# Patient Record
Sex: FEMALE | Race: WHITE | NOT HISPANIC OR LATINO | URBAN - METROPOLITAN AREA
[De-identification: names, ages, dates, MRNs, and addresses within clinical notes are randomized per-mention and may not be internally consistent; named-entity substitution may affect disease eponyms.]

---

## 2022-04-11 ENCOUNTER — MEDICAL CORRESPONDENCE (OUTPATIENT)
Dept: HEALTH INFORMATION MANAGEMENT | Facility: CLINIC | Age: 57
End: 2022-04-11
Payer: COMMERCIAL

## 2022-04-13 ENCOUNTER — TELEPHONE (OUTPATIENT)
Dept: ENDOCRINOLOGY | Facility: CLINIC | Age: 57
End: 2022-04-13
Payer: COMMERCIAL

## 2022-04-13 NOTE — TELEPHONE ENCOUNTER
I will forward this patient request to Dr.Angela Guerrero and get her thoughts on how to proceed with this referral  Angelica Ortiz, RN, [facility]  Diabetes Educator and RN Care Coordinator  Adult Endocrinology  [facility]

## 2022-04-13 NOTE — TELEPHONE ENCOUNTER
[facility] Call Center    Phone Message    May a detailed message be left on voicemail: yes     Reason for Call: Other: Patient is being referred by Anthony Lesch with [facility] to be seen for Primary hyperparathyroidism. Writer contacted patient to schedule but patient stated that she would like to see Dr Guerrero due to some of her siblings being patients of hers. Per protocols, Dr Guerrero is not taking new patients for this diagnosis. Sending encounter to clinic per patient's request due to her siblings being patients of hers. Please review and call patient.     Action Taken: Message routed to:  Adult Clinics: Endocrinology p [phone]    Travel Screening: Not Applicable

## 2022-05-10 ENCOUNTER — TELEPHONE (OUTPATIENT)
Dept: ENDOCRINOLOGY | Facility: CLINIC | Age: 57
End: 2022-05-10
Payer: COMMERCIAL

## 2022-05-10 ENCOUNTER — VIRTUAL VISIT (OUTPATIENT)
Dept: ENDOCRINOLOGY | Facility: CLINIC | Age: 57
End: 2022-05-10
Payer: COMMERCIAL

## 2022-05-10 DIAGNOSIS — R23.2 HOT FLASHES: ICD-10-CM

## 2022-05-10 DIAGNOSIS — E21.0 PRIMARY HYPERPARATHYROIDISM (H): Primary | ICD-10-CM

## 2022-05-10 PROCEDURE — 99215 OFFICE O/P EST HI 40 MIN: CPT | Mod: GT | Performed by: INTERNAL MEDICINE

## 2022-05-10 RX ORDER — CYCLOBENZAPRINE HCL 5 MG
5 TABLET ORAL
COMMUNITY
Start: 2021-10-22 | End: 2022-11-09

## 2022-05-10 NOTE — LETTER
5/10/2022         RE: Betsy Sutton  [address] [address] [address]        Dear Colleague,    Thank you for referring your patient, Betsy Sutton, to the [facility]. Please see a copy of my visit note below.    Video-Visit Details    Type of service:  Video Visit    Video call duration:    Start: 2:45 pm   Stop: 3:49 pm     Originating Location (pt. Location): Home  Distant Location (provider location):  [facility]   Platform used for Video Visit: Expa    The patient is seen in consultation at the request of Anthony Lesch for evaluation of hypercalcemia.     Betsy Sutton is a 56 year old female with a past medical history significant for migraine, Ménière disease, cervical radiculopathy and CVA.     The patient remembers being told she has a mildly elevated calcium level which needs monitoring years ago.   I reviewed the outside records.  The patient has been experiencing intermittent episodes of mild hypercalcemia since 2014, with a maximum documented calcium level of 10.7, on 3/15/2022, when parathyroid hormone level was elevated at 94 (15-65) and alkaline phosphatase was also mildly elevated at 123 ().  Prior parathyroid hormone level was elevated at 78 (14-72), in 2021 and normal in 2014 and 2015.  GFR has been normal.  Vitamin D level was 20.1, on 4/1/2022.     The abdominal CT from April 2022 revealed a nonobstructing 5 mm stone in the mid left kidney.  She had a DEXA scan done in April 2022 at [facility].  The images are not available for my review at the time of the visit.  The patient read to me the T-scores: -3.2 at the arm, -2.7 at the lumbar spine, -2.2 and -3.2 at the hip.    The patient remembers 1 episode of abdominal pain which occurred a couple of months ago when she thought she might had passed a kidney stone.  Otherwise, she has not been aware of kidney stones until the recent abdominal CT from April  2022.    Betsy denies being formally diagnosed with hypertension.  In the past, she has always been told she has a low blood pressure.  Recently, her blood pressure numbers have been higher.  Typically, she is intolerant to cold temperatures.  However, in the last couple of months, she has been experiencing episodes of hot flashes associated with palpitations, present several times a month.  She is not sure whether or not they are associated with increased stress.  The hot flashes which she used to experience around the menopause resolved.  She denies facial flushing, headaches, tremors, at the time she has the hot flashes.     She does not take any calcium supplements.  Multivitamins make her feel sick.  Her diet is quite restricted due to intolerance to certain food products.  She cannot tolerate dairy products.  She has been taking 2000 units vitamin D daily for the last month.  In the past, she used to take fish oil supplemented with vitamin D but she discontinued it for approximately 1 year.  Menopause occurred at age 60 and for period of 6 months she was treated with hormone replacement therapy.  Was not able to tolerate estrogen due to headaches.  Physical exercise consists of walking, for 30 to 40 minutes on a daily basis.  There is no family history of hip fractures, kidney stones or osteoporosis.      Other pertinent labs reviewed:  6/17/2021 Free T4 0.86, TSH 2.2  2013 total testosterone normal at 28 (14-76)    Past Medical History   Migraines   Meniere disease   CVA at age 36 - manifested with dizziness and severe headache   Factor 8 slightly elevated   Cervical radiculopathy   No fractures     Past Surgical History   No past surgical history on file.    Current Medications    Current Outpatient Medications:      cyclobenzaprine (FLEXERIL) 5 MG tablet, Take 5 mg by mouth, Disp: , Rfl:      ubrogepant (UBRELVY) 100 MG tablet, TAKE 1 TAB BY MOUTH ONCE, MAY REPEAT ONCE AFTER 2 HRS IF NEEDED, NOT TO EXCEED  200 MG IN 24 HRS, Disp: , Rfl:     Family History   Brother - thyroid cancer. Paternal grandfather CVA.     Social History  . She has 2 children. She denies smoking; she occasionally drinks alcohol. Denies using illicit drugs. Occupation: Teacher - first grade.     Review of Systems   Systemic:             Fatigue noticed over the years; not sleeping well   Eye:                      Episodes of blurred vision, not corrected by the glasses for the last couple of years   Lesly-Laryngeal:     No dysphagia, voice has been raspy at times, no cough     Breast:                  No breast symptoms  Cardiovascular:    As above   Pulmonary:           No pulmonary symptoms, no SOB or cough    Gastrointestinal:   She used to develop abd pain from certain food products - occasional episodes of diarrhea or constipation, no nausea or vomiting  Genitourinary:       increased thirst due during the night; she does not wake up during the night to use the restroom  Endocrine:            cold hands and feet   Neurological:        longstanding headaches/migraines; no tremor; numbness or tingling sensation in the R arm/hand; vertigo   Musculoskeletal:  Neck and R should pain; knee pain, toes pain; lower back pain   Skin:                     No skin symptoms, no dry skin, no hair falling out   Psychological:     Anxiety - got worse over the last couple of months; no difficulty concentrating, has mentions she has a harder time finding the right words.               Vital Signs     Previous Weights:    Wt Readings from Last 10 Encounters:   No data found for Wt        There were no vitals taken for this visit.    Physical Exam  General Appearance: alert, no distress noted   Eyes: grossly normal to inspection, conjunctivae and sclerae normal, no lid lag or stare   Respiratory: no audible wheeze, cough, or visible cyanosis.  No visible retractions or increased work of breathing.  Able to speak fully in complete sentences.  Neurological:  Cranial nerves grossly intact, mentation intact and speech normal; no tremor of the hands   Skin: no lesions on exposed skin   Psychological: mentation appears normal, affect normal, judgement and insight intact, normal speech and appearance well-groomed      Lab Results  I reviewed prior lab results documented in Epic.  No results found for: TSH    Assessment     Betsy Sutton is a 56 year old female with a past medical history significant for migraine, Ménière disease, cervical radiculopathy and CVA, seen for evaluation of primary hyperparathyroidism.    1.  Primary hyperparathyroidism  The patient has been experiencing intermittent episodes of hypercalcemia since 2014.  Recent imaging test revealed a silent kidney stone and a new diagnosis of osteoporosis.  Clinically, she does endorse fatigue, worsening anxiety, some difficulty finding words and back pain, but no other symptoms which can be attributed to hypercalcemia.  Recommendations:  Schedule an appointment with Dr. Ro  Start taking 500 mg calcium twice daily  Have a 24-hour urinary calcium checked in 2 or 3 weeks  Recheck calcium, phosphorus levels with her next lab work    2.  Osteoporosis  Continue to take the same dose of 2000 units vitamin D daily, in addition to calcium  F/up DXA scan in 1 year  If the bone mineral density does not improve following parathyroidectomy, the plan is consider active medications for osteoporosis.    3.  Hot flashes, of unclear etiology  Check 24-hour urinary metanephrines  Recheck thyroid hormone level     Orders Placed This Encounter   Procedures     TSH with free T4 reflex     Albumin level     Phosphorus     Parathyroid Hormone Intact     Calcium     Calcium timed urine with Creat Ratio     Creatinine timed urine     Metanephrine random or 24 hr urine     Otolaryngology Referral     65 minutes spent on the date of the encounter doing chart review, history and exam, documentation and further activities as noted  above.                        Betsy Sutton  is being evaluated via a billable video visit.      How would you like to obtain your AVS? [employer]  For the video visit, send the invitation by: Text to cell phone: [phone]  Will anyone else be joining your video visit? No            Again, thank you for allowing me to participate in the care of your patient.        Sincerely,        Lisa Guerrero MD

## 2022-05-10 NOTE — TELEPHONE ENCOUNTER
Patient was able to get connected for her visit.    Aileen Ramirez CMA  Adult Endocrinology  [facility]

## 2022-05-10 NOTE — PATIENT INSTRUCTIONS
Instructions for Collection of a 24 hour or Timed Urine Specimen    Your doctor has requested that you collect all of your urine for a 24 hour urine lab test. Please follow the instructions below    1. The day before you are to bring your specimen:  At 7:00am (or when you get up the day) empty your bladder as completely as possible. Discard this specimen.    2. During the 24 hour collection period store the collection container in a cool place or in the refrigerator. Some collections require a special preservative that will be in the container if required.    3. At 7:00am the next day (or when you started your urine collection on the previous day) void and add to the collection container. Record the time and date of the end of the collection period. This completes the 24 hour urine collection.    4. Bring the entire specimen directly to the lab  A lab with your name, medical record number and date of birth must be attached to the urine container.  A lab request form completed by your clinic/doctor with your name medical number number, date of birth and test requested must accompany the urine specimen.  Record on the lab request form the date and time (am/pm) of the start of the urine collection and date and time of the end of the urine collection.    5. If you have any questions, feel free to call the laboratory at [phone] or [phone] or your clinic.

## 2022-05-10 NOTE — TELEPHONE ENCOUNTER
[facility] Call Center    Phone Message    May a detailed message be left on voicemail: yes     Reason for Call: Other: Patient needs assitance with video visit start time 2:30pm they report not having a link .      Action Taken: Other: ENDO    Travel Screening: Not Applicable

## 2022-05-10 NOTE — PROGRESS NOTES
Video-Visit Details    Type of service:  Video Visit    Video call duration:    Start: 2:45 pm   Stop: 3:49 pm     Originating Location (pt. Location): Home  Distant Location (provider location):  [facility]   Platform used for Video Visit: Shaista    The patient is seen in consultation at the request of Anthony Lesch for evaluation of hypercalcemia.     Betsy Sutton is a 56 year old female with a past medical history significant for migraine, Ménière disease, cervical radiculopathy and CVA.     The patient remembers being told she has a mildly elevated calcium level which needs monitoring years ago.   I reviewed the outside records.  The patient has been experiencing intermittent episodes of mild hypercalcemia since 2014, with a maximum documented calcium level of 10.7, on 3/15/2022, when parathyroid hormone level was elevated at 94 (15-65) and alkaline phosphatase was also mildly elevated at 123 ().  Prior parathyroid hormone level was elevated at 78 (14-72), in 2021 and normal in 2014 and 2015.  GFR has been normal.  Vitamin D level was 20.1, on 4/1/2022.     The abdominal CT from April 2022 revealed a nonobstructing 5 mm stone in the mid left kidney.  She had a DEXA scan done in April 2022 at [facility].  The images are not available for my review at the time of the visit.  The patient read to me the T-scores: -3.2 at the arm, -2.7 at the lumbar spine, -2.2 and -3.2 at the hip.    The patient remembers 1 episode of abdominal pain which occurred a couple of months ago when she thought she might had passed a kidney stone.  Otherwise, she has not been aware of kidney stones until the recent abdominal CT from April 2022.    Betsy denies being formally diagnosed with hypertension.  In the past, she has always been told she has a low blood pressure.  Recently, her blood pressure numbers have been higher.  Typically, she is intolerant to cold temperatures.  However, in the  last couple of months, she has been experiencing episodes of hot flashes associated with palpitations, present several times a month.  She is not sure whether or not they are associated with increased stress.  The hot flashes which she used to experience around the menopause resolved.  She denies facial flushing, headaches, tremors, at the time she has the hot flashes.     She does not take any calcium supplements.  Multivitamins make her feel sick.  Her diet is quite restricted due to intolerance to certain food products.  She cannot tolerate dairy products.  She has been taking 2000 units vitamin D daily for the last month.  In the past, she used to take fish oil supplemented with vitamin D but she discontinued it for approximately 1 year.  Menopause occurred at age 60 and for period of 6 months she was treated with hormone replacement therapy.  Was not able to tolerate estrogen due to headaches.  Physical exercise consists of walking, for 30 to 40 minutes on a daily basis.  There is no family history of hip fractures, kidney stones or osteoporosis.      Other pertinent labs reviewed:  6/17/2021 Free T4 0.86, TSH 2.2  2013 total testosterone normal at 28 (14-76)    Past Medical History   Migraines   Meniere disease   CVA at age 36 - manifested with dizziness and severe headache   Factor 8 slightly elevated   Cervical radiculopathy   No fractures     Past Surgical History   No past surgical history on file.    Current Medications    Current Outpatient Medications:      cyclobenzaprine (FLEXERIL) 5 MG tablet, Take 5 mg by mouth, Disp: , Rfl:      ubrogepant (UBRELVY) 100 MG tablet, TAKE 1 TAB BY MOUTH ONCE, MAY REPEAT ONCE AFTER 2 HRS IF NEEDED, NOT TO EXCEED 200 MG IN 24 HRS, Disp: , Rfl:     Family History   Brother - thyroid cancer. Paternal grandfather CVA.     Social History  . She has 2 children. She denies smoking; she occasionally drinks alcohol. Denies using illicit drugs. Occupation: Teacher - first  grade.     Review of Systems   Systemic:             Fatigue noticed over the years; not sleeping well   Eye:                      Episodes of blurred vision, not corrected by the glasses for the last couple of years   Lesly-Laryngeal:     No dysphagia, voice has been raspy at times, no cough     Breast:                  No breast symptoms  Cardiovascular:    As above   Pulmonary:           No pulmonary symptoms, no SOB or cough    Gastrointestinal:   She used to develop abd pain from certain food products - occasional episodes of diarrhea or constipation, no nausea or vomiting  Genitourinary:       increased thirst due during the night; she does not wake up during the night to use the restroom  Endocrine:            cold hands and feet   Neurological:        longstanding headaches/migraines; no tremor; numbness or tingling sensation in the R arm/hand; vertigo   Musculoskeletal:  Neck and R should pain; knee pain, toes pain; lower back pain   Skin:                     No skin symptoms, no dry skin, no hair falling out   Psychological:     Anxiety - got worse over the last couple of months; no difficulty concentrating, has mentions she has a harder time finding the right words.               Vital Signs     Previous Weights:    Wt Readings from Last 10 Encounters:   No data found for Wt        There were no vitals taken for this visit.    Physical Exam  General Appearance: alert, no distress noted   Eyes: grossly normal to inspection, conjunctivae and sclerae normal, no lid lag or stare   Respiratory: no audible wheeze, cough, or visible cyanosis.  No visible retractions or increased work of breathing.  Able to speak fully in complete sentences.  Neurological: Cranial nerves grossly intact, mentation intact and speech normal; no tremor of the hands   Skin: no lesions on exposed skin   Psychological: mentation appears normal, affect normal, judgement and insight intact, normal speech and appearance well-groomed      Lab  Results  I reviewed prior lab results documented in Epic.  No results found for: TSH    Assessment     Betsy Sutton is a 56 year old female with a past medical history significant for migraine, Ménière disease, cervical radiculopathy and CVA, seen for evaluation of primary hyperparathyroidism.    1.  Primary hyperparathyroidism  The patient has been experiencing intermittent episodes of hypercalcemia since 2014.  Recent imaging test revealed a silent kidney stone and a new diagnosis of osteoporosis.  Clinically, she does endorse fatigue, worsening anxiety, some difficulty finding words and back pain, but no other symptoms which can be attributed to hypercalcemia.  Recommendations:  Schedule an appointment with Dr. Ro  Start taking 500 mg calcium twice daily  Have a 24-hour urinary calcium checked in 2 or 3 weeks  Recheck calcium, phosphorus levels with her next lab work    2.  Osteoporosis  Continue to take the same dose of 2000 units vitamin D daily, in addition to calcium  F/up DXA scan in 1 year  If the bone mineral density does not improve following parathyroidectomy, the plan is consider active medications for osteoporosis.    3.  Hot flashes, of unclear etiology  Check 24-hour urinary metanephrines  Recheck thyroid hormone level     Orders Placed This Encounter   Procedures     TSH with free T4 reflex     Albumin level     Phosphorus     Parathyroid Hormone Intact     Calcium     Calcium timed urine with Creat Ratio     Creatinine timed urine     Metanephrine random or 24 hr urine     Otolaryngology Referral     65 minutes spent on the date of the encounter doing chart review, history and exam, documentation and further activities as noted above.

## 2022-05-10 NOTE — PROGRESS NOTES
Betsy Sutton  is being evaluated via a billable video visit.      How would you like to obtain your AVS? [employer]  For the video visit, send the invitation by: Text to cell phone: [phone]  Will anyone else be joining your video visit? No

## 2022-05-10 NOTE — NURSING NOTE
Medication additional: Pt notes also taking Vitamin D 2000 mg  Allergies: Unsure about the Erythromycin

## 2022-05-11 ENCOUNTER — TELEPHONE (OUTPATIENT)
Dept: ENDOCRINOLOGY | Facility: CLINIC | Age: 57
End: 2022-05-11
Payer: COMMERCIAL

## 2022-05-11 NOTE — TELEPHONE ENCOUNTER
5/11 1st attempt.  LVM for patient to schedule:      -  6 month follow up with Cesar around 11/10/22.    - 24 hour urine/fasting labs in 2-3 weeks    - consult with Dr. Ro at patient convenience.    Please assist patient in scheduling when she calls back.    Thanks    Marly Lyles  Pediatric Specialty /Adult Endocrinology  [facility]

## 2022-05-12 ENCOUNTER — LAB (OUTPATIENT)
Dept: LAB | Facility: CLINIC | Age: 57
End: 2022-05-12
Payer: COMMERCIAL

## 2022-05-12 DIAGNOSIS — R23.2 HOT FLASHES: ICD-10-CM

## 2022-05-12 LAB — TSH SERPL DL<=0.005 MIU/L-ACNC: 2.15 MU/L (ref 0.4–4)

## 2022-05-12 PROCEDURE — 36415 COLL VENOUS BLD VENIPUNCTURE: CPT

## 2022-05-12 PROCEDURE — 84443 ASSAY THYROID STIM HORMONE: CPT

## 2022-05-29 ENCOUNTER — HEALTH MAINTENANCE LETTER (OUTPATIENT)
Age: 57
End: 2022-05-29

## 2022-06-07 ENCOUNTER — LAB (OUTPATIENT)
Dept: LAB | Facility: CLINIC | Age: 57
End: 2022-06-07
Payer: COMMERCIAL

## 2022-06-07 DIAGNOSIS — E21.0 PRIMARY HYPERPARATHYROIDISM (H): ICD-10-CM

## 2022-06-07 DIAGNOSIS — R23.2 HOT FLASHES: ICD-10-CM

## 2022-06-07 LAB
ALBUMIN SERPL-MCNC: 4.2 G/DL (ref 3.4–5)
CALCIUM 24H UR-MRATE: 0.26 G/SPEC
CALCIUM SERPL-MCNC: 10.4 MG/DL (ref 8.5–10.1)
CALCIUM UR-MCNC: 8.5 MG/DL
CALCIUM/CREAT UR: 0.22 G/G CR
COLLECT DURATION TIME UR: 24 H
COLLECT DURATION TIME UR: 24 H
CREAT 24H UR-MRATE: 1.16 G/SPEC (ref 0.8–1.8)
CREAT 24H UR-MRATE: 1.16 G/SPEC (ref 0.8–1.8)
CREAT UR-MCNC: 38 MG/DL
CREAT UR-MCNC: 38 MG/DL
PHOSPHATE SERPL-MCNC: 3.8 MG/DL (ref 2.5–4.5)
PTH-INTACT SERPL-MCNC: 91 PG/ML (ref 18–80)
SPECIMEN VOL UR: 3050 ML
SPECIMEN VOL UR: 3050 ML

## 2022-06-07 PROCEDURE — 83835 ASSAY OF METANEPHRINES: CPT | Mod: 90

## 2022-06-07 PROCEDURE — 99000 SPECIMEN HANDLING OFFICE-LAB: CPT

## 2022-06-07 PROCEDURE — 82310 ASSAY OF CALCIUM: CPT

## 2022-06-07 PROCEDURE — 82340 ASSAY OF CALCIUM IN URINE: CPT

## 2022-06-07 PROCEDURE — 82040 ASSAY OF SERUM ALBUMIN: CPT

## 2022-06-07 PROCEDURE — 36415 COLL VENOUS BLD VENIPUNCTURE: CPT

## 2022-06-07 PROCEDURE — 82570 ASSAY OF URINE CREATININE: CPT

## 2022-06-07 PROCEDURE — 83970 ASSAY OF PARATHORMONE: CPT

## 2022-06-07 PROCEDURE — 84100 ASSAY OF PHOSPHORUS: CPT

## 2022-06-07 PROCEDURE — 81050 URINALYSIS VOLUME MEASURE: CPT

## 2022-06-09 ENCOUNTER — PREP FOR PROCEDURE (OUTPATIENT)
Dept: SURGERY | Facility: CLINIC | Age: 57
End: 2022-06-09
Payer: COMMERCIAL

## 2022-06-09 ENCOUNTER — TELEPHONE (OUTPATIENT)
Dept: SURGERY | Facility: CLINIC | Age: 57
End: 2022-06-09

## 2022-06-09 ENCOUNTER — OFFICE VISIT (OUTPATIENT)
Dept: SURGERY | Facility: CLINIC | Age: 57
End: 2022-06-09
Payer: COMMERCIAL

## 2022-06-09 VITALS
SYSTOLIC BLOOD PRESSURE: 143 MMHG | HEIGHT: 67 IN | WEIGHT: 129.6 LBS | OXYGEN SATURATION: 99 % | BODY MASS INDEX: 20.34 KG/M2 | HEART RATE: 89 BPM | DIASTOLIC BLOOD PRESSURE: 110 MMHG

## 2022-06-09 DIAGNOSIS — E21.3 HYPERPARATHYROIDISM (H): Primary | ICD-10-CM

## 2022-06-09 DIAGNOSIS — E21.0 PRIMARY HYPERPARATHYROIDISM (H): ICD-10-CM

## 2022-06-09 PROCEDURE — 99203 OFFICE O/P NEW LOW 30 MIN: CPT | Performed by: SURGERY

## 2022-06-09 RX ORDER — DEXAMETHASONE SODIUM PHOSPHATE 10 MG/ML
10 INJECTION, SOLUTION INTRAMUSCULAR; INTRAVENOUS ONCE
Status: CANCELLED | OUTPATIENT
Start: 2022-06-09 | End: 2022-06-09

## 2022-06-09 ASSESSMENT — PAIN SCALES - GENERAL: PAINLEVEL: MODERATE PAIN (5)

## 2022-06-09 NOTE — TELEPHONE ENCOUNTER
Scheduled surgery in clinic.     Changed surgery from 7/5 to 7/12 in [place] because patient needs nuc med injection/scan prior to surgery. The pharmacy does not believe we will get the nuc med medication in time due to the holiday. They said we may, but there is no guarantee we will get it on time for surgery.       On 7/12, patient will go to [place] for injection and scan at 7:15 AM. After injection/scan, patient will travel to [facility] for surgery and check in at 12:00 PM for 1:30 surgery.     Post-op has been rescheduled to 7/25 in [place] as the [facility] clinic is closed that week.    Left a detailed message for patient with above information (patient gave permission to) and sent a very detailed [employer] message to patient, per patient request.     H&P with PCP.    Patient will take an at home COVID test 1-2 days before the procedure and will either upload a picture on [employer] or bring the results with her the day of surgery.    Patient already has surgery packet.    No other questions/concerns.

## 2022-06-09 NOTE — NURSING NOTE
"Betsy Sutton's chief complaint for this visit includes:  Chief Complaint   Patient presents with     Consult     Primary hyperparathyroidism     PCP: Lesch, Anthony    Referring Provider:  Lisa Guerrero MD  [id number] [place] [address]    BP (!) 143/110   Pulse 89   Ht 1.702 m (5' 7\")   Wt 58.8 kg (129 lb 9.6 oz)   SpO2 99%   BMI 20.30 kg/m    Moderate Pain (5)        Allergies   Allergen Reactions     Codeine Other (See Comments)     Penicillins Other (See Comments)     Sulfa Drugs Unknown         Do you need any medication refills at today's visit? No    Aye Loredo CMA        "

## 2022-06-09 NOTE — LETTER
"    6/9/2022         RE: Betsy Sutton  [address] [address]        Dear Colleague,    Thank you for referring your patient, Betsy Sutton, to the [facility]. Please see a copy of my visit note below.    SURGERY CLINIC CONSULTATION    REASON FOR CONSULTATION:  Betsy Sutton was referred by Dr. Guerrero for evaluation and discussion of treatment options for HPTH     HISTORY OF PRESENT ILLNESS:  Betsy Sutton is a 56 year old female who has a history of hypercalcemia since 2014. Most recent labs: Ca 10.4, PTH 91, UkCa 220mg/24hrs, Vitd D 21.     Symptoms associated with HPTH include CT-nehpriliathiasis, Osteoporosis. Muscle aches and fatigue. No family or previous h/o HPTH.        REVIEW OF SYSTEMS:  ROS EXAM: 10pt ROS pertinent for that noted in HPI  Patient Active Problem List   Diagnosis     Hyperparathyroidism (H)       Past Surgical History:   Procedure Laterality Date     PARATHYROIDECTOMY N/A 7/12/2022    Procedure: PARATHYROIDECTOMY;  Surgeon: Lori Ro MD;  [facility] [facility] [place]       Allergies   Allergen Reactions     Codeine Other (See Comments)     Erythromycin      Penicillins Other (See Comments)     Sulfa Drugs Unknown       Medications reviewed in EMR        No family history on file.     PHYSICAL EXAM:  BP (!) 143/110   Pulse 89   Ht 1.702 m (5' 7\")   Wt 58.8 kg (129 lb 9.6 oz)   SpO2 99%   BMI 20.30 kg/m      Neck: Thyroid gland normal size and without nodules. No cervical lymphadenopathy    I personally reviewed the radiographic images and laboratory data    ASSESSMENT:   1. Primary hyperparathyroidism (H)        PLAN:   I would first like to get localizing scan (Nuc Med). Then recommend neck exploration resection parathyroid adenoma. The procedure and risks were discussed with the patient. Will schedule for surgery.    Lori Ro MD                Again, thank you for allowing me to participate in the care of your " patient.        Sincerely,        Lori Ro MD

## 2022-06-09 NOTE — PATIENT INSTRUCTIONS
Surgery Instructions    Always follow your surgeon s instructions. If you don t, your surgery could be cancelled. Please use the following checklist.  Your surgery is on: The surgery scheduler will contact you within 1 week of your consult with the surgeon. If you do not hear from them, please call the clinic or RN Care Coordinator for your provider.    Time: Prearrival times can vary depending on location/type of surgery.  [place] - 2 hour pre-arrival  [facility]/[place] - 2 hour pre-arrival  [place] - 1 hour pre-arrival    Note:  These times may change. A nurse will call you before surgery to confirm. If you have not received a call or if you have more questions, please call us on the working day before your surgery:  [place]: [phone] or [phone] (9am to 5:30pm)  [facility]: [phone] (8am to 6pm)  [place]: [phone] (9am to 5pm)  [facility] [phone] (7am to 4pm)  Prior to surgery  Have a pre-op physical exam with your Primary Doctor within 30 days of surgery  Ask your doctor to send all of your results to the surgery center/hospital before surgery. Your doctor also may ask you to bring the results with you on the day of surgery.  Tell your doctor if:  You are allergic to latex or rubber (latex and rubber gloves are often used in medical care).  You are taking any medicines (including aspirin), vitamins, or herbal products. You may need to stop taking some medicines before surgery.  You have any medical problems (allergies, diabetes, or heart disease, for example).  You have a pacemaker or an AICD (automatic implanted cardiac defibrillator). If you do, please bring the ID card with you on the day of surgery.  People who smoke have a higher risk of infection after surgery. Ask your doctor how you can quit smoking.  If you Primary Doctor is not within the [facility] system, you will need to have your pre-op physical faxed to us to be scanned into your chart.  [place]: [phone]  or [phone]  [facility] ([place]): [phone]  [facility] ([facility]): [phone]  Schedule to complete pre-procedure COVID-19 Testing    - All patients MUST get tested for COVID-19. Your test needs to happen 2 to 4 days before you check in to the hospital or surgery site.  - A clinic scheduler will call about a week in advance to set up a testing time at one of our labs. We ll take a swab of your nose or throat.  Note: If you go to a clinic or pharmacy like [facility] or [facility] for your test, make sure you get a test inside the nose. Tests inside the nose are:   - A naso-pharyngeal (NP) RT-PCR test   - An anterior nares RT-PCR test  - Do NOT get a  rapid  test or a saliva (spit) test.  - After the test, please stay at home and stay out of contact with other people. It will be harder for you to recover if you get COVID-19 before your treatment.  Call your insurance company. Ask if you need pre-approval for your surgery. If you do not have insurance, please let us know. If you wish to speak to the , please alert the clinic staff so this can be arranged.  Arrange for someone to drive you home after surgery.  will need to be a responsible adult (18 years or older) that will provide transportation to and from surgery and stay in the waiting room during your surgery. You may not drive yourself or take public transportation to and from surgery.  Arrange for someone to stay with you for 24 hours after you go home. This person must be a responsible adult (18 years or older).  Call your surgeon or their nurse if there is any change in your health (cold, flu, infections, hospitalizations).  Do not smoke, drink alcohol, or take over-the-counter medicine for 24 hours before and after surgery.  If you take prescribed drugs, you may need to stop them until after the surgery.  Discuss what medications to take or not take prior to surgery with your Primary Doctor at your pre-op physical.  Avoid over-the-counter blood-thinning medications such as Aspirin, Ibuprofen, vitamin E, or fish oil 7 days prior to surgery (unless otherwise directed by your Primary Doctor). Tylenol is a good alternative for mild pain relief prior to surgery.  Eating and drinking guidelines prior to surgery:  Stop all solid food consumption 8 hours prior to surgery  You may drink clear liquids up to 2 hours prior to surgery (water, fruit juices without pulp, jello, tea/coffee without creamer, sports drinks, clear-fat free broth (bouillon or consomme), popsicles (without milk, bits of fruit, or seeds/nuts)  Follow instructions given for showering or bathing before surgery.    Use 8 ounces of antiseptic surgical soap, like:  Hibiclens, Scrub Care, or Exidine  You can find it at your local pharmacy, clinic, or retail store. If you have trouble, ask your pharmacist to help you find the right substitute.  Please wash with one of the above soaps twice before coming to the hospital for your surgery. This will decrease bacteria (germs) on your skin. It will also help reduce your chance of infection after surgery.  Items you will need for showerin newly washed washcloths  2 newly washed towels  8 ounces of one of the above soaps  Following these instructions:  The evening before surgery: Shower or bathe as you normally would, using your regular soap and a clean washcloth. Give special attention to places where your incision (surgical cut) or catheters will be. This includes your groin area. Rinse well. You may wash your hair with your regular shampoo. Next, wash your body with 4 ounces of the antiseptic soap. Use a clean, damp washcloth and gently clean your body (from the chin down). If your surgery involves your head, use the special soap on your head and scalp. Rinse well and dry off using a newly washed towel.  The morning of surgery: Repeat the same process as the evening shower.  Other suggestions:  Do not shave within 12  inches of your incision (surgical cut) area for at least 3 days before surgery. Shaving can make small cuts in the skin. This puts you at higher risk of infection.  Wear freshly washed pajamas or clothing after your evening shower.  Wear freshly washed clothes the day of surgery.  Wash and change your bed sheets the day before surgery to have clean bed sheets after your shower and when you get home from surgery.  If you have trouble washing all areas, make sure someone helps you.  Don't use any deodorant, lotion or powder after your shower.  Women who are menstruating should wear a fresh sanitary pad to the hospital.  Do not wear or add deodorant, cologne, lotion, makeup, nail polish or jewelry to surgery. If you wear fake nails, please remove at least one nail before coming to surgery (an oxygen monitor needs to be placed on your finger during surgery).  Bring these items to the surgery center/hospital:  Insurance card  Money for parking and co-pays, if needed  A list of all the medicines you take. Include vitamins, minerals, herbs, and over-the-counter drugs.  Note any drug allergies.  A copy of your advance health care directive, if you have one. This tells us what treatment you would want--and who would make health care decisions--if you could no longer speak for yourself. You may request this form in advance or download it from [email or web address].  A case for glasses, contact lenses, hearing aids, or dentures.  Your inhaler or CPAP machine, if you use these at home.  Leave extra cash, jewelry, and other valuables at home.  When you arrive  When you get to the surgery center/hospital, you will:  Check in. If you are under age 18, you must be with a parent or legal guardian.  Sign consent forms, if you haven t already. These forms state that you know the risks and benefits of surgery. When you sign the forms, you give us permission to do the surgery. Do not sign them unless you understand what will happen  during and after your surgery. If you have any questions about your surgery, ask to speak with your doctor before you sign the forms. If you don t understand the answers, ask again.  Receive a copy of the Patient s Bill of Rights. If you do not receive a copy, please ask for one.  Change into hospital clothes. Your belongings will be placed in a bag. We will return them to you after surgery.  Meet with the anesthesia provider. He or she will tell you what kind of anesthesia (medicine) will be used to keep you comfortable during surgery.  Remember: it s okay to remind doctors and nurses to wash their hands before touching you.  In most cases, your surgeon will use a marker to write his or her initials on the surgery site. This ensures that the exact site is operated on.  For safety reasons, we will ask you the same questions many times. For example, we may ask your name and birth date over and over again.  Friends and family can stay with you until it s time for surgery. While you re in surgery, they will be in the waiting area. Please note that cell phones are not allowed in some patient care areas.  If you have questions about what will happen in the operating room, talk to your care team.  After surgery  We will move you to a recovery room, where we will watch you closely. If you have any pain or discomfort, tell your nurse. He or she will try to make you comfortable.  If you are staying overnight, we will move you to your hospital room after you are awake.  If you are going home, we will move you to another room. Friends and family may be able to join you. The length of time you spend in recovery depend on the type of medicine you received, your medical condition, the type of surgery you had, or your response to the anesthesia given during your procedure.  When you are discharged from the recovery room, the nurses will review instructions with you and your caregiver.  Please wash your hands every time you touch  the wound or change bandages or dressings.  Do not submerge the wound in water.  You may not use a bathtub or hot tub until the wound is closed. The wait time frame is generally 2-3 weeks, but any open area can be a source of incoming bacteria, so it is better to be on the safe side and avoid water submersion until your wound is fully healed.  You may take a shower 24 hours after surgery. Double check with your surgeon if it is OK for water to run over the wound, whether it has been sutured, stapled, glued, or is open. You may gently wash the wound using the antiseptic soap provided for your pre-surgery showering (do not use a washcloth). Any mild soap will work as well.  Many surgical wounds will have small white strips of tape on them called steri-strips.  Do not remove these. The edges will curl and fall off within 7-10 days with normal showering.  If you are going home with sutures (stitches) or staples, you must return to the clinic to have them taken out, usually within 1-2 weeks. Some stitches are dissolvable and do not require removal. Make sure to clarify with your surgeon or surgery nurse reviewing discharge paperwork what kind of sutures you have.  Signs and symptoms of infection include:  Fever, temperature over 101.5   F  Redness  Swelling  Increased pain  Green or yellow drainage which may or may not have a foul odor  Dealing with pain  A nurse will check your comfort level often during your stay. He or she will work with you to manage your pain.  Remember:  All pain is real. There are many ways to control pain. We can help you decide what works best for you.  Ask for pain medicine when you need it. Don t try to  tough it out --this can make you feel worse. Always take your medicine as ordered.  Medicine doesn t work the same for everyone. If your medicine isn t working, tell your nurse. There may be other medicines or treatments we can try.  Going home  We will let you know when you re ready to leave  the surgery center or hospital. Before you leave, we will tell you how to care for yourself at home and prevent infections. If you do not understand something, please say so. We will answer any questions you have. We will then help you get ready to leave.  Remember, you must have a responsible adult (18 years or older) to stay with you 24 hours after you leave the hospital.  Take it easy when you get home. You will need some time to recover--you may be more tired than you realize at first. Rest and relax for at least the first 24 hours at home. You ll feel better and heal faster if you take good care of yourself.  Follow the discharge instructions that are given to you when you leave the surgery center or hospital  Please call the clinic if you experience any problems during regular clinic hours (Monday-Friday 8:00am-5:00pm).  If you experience problems during non-clinic hours, please call the [facility] on-call line at [phone] and ask the  to page the on-call Provider for your specialty. The on-call Provider will call you back and can triage your symptoms and further advise. If you are having an emergency, always call 911 or seek immediate evaluation at the Emergency Room.  Locations  [facility] ([facility] [place]  [address] [place] [address] [id number] [phone]   [email or web address]

## 2022-06-10 LAB
COLLECT DURATION TIME SPEC: NORMAL H
CREAT 24H UR-MRATE: 1220 MG/D
CREAT UR-MCNC: 40 MG/DL
METANEPH 24H UR-MCNC: 32 UG/L
METANEPH 24H UR-MRATE: 98 UG/D
METANEPH+NORMETANEPH UR-IMP: NORMAL
METANEPH/CREAT 24H UR: 80 UG/G CRT
NORMETANEPHRINE 24H UR-MCNC: 65 UG/L
NORMETANEPHRINE 24H UR-MRATE: 198 UG/D
NORMETANEPHRINE/CREAT 24H UR: 162 UG/G CRT
SPECIMEN VOL ?TM UR: NORMAL ML

## 2022-07-11 ENCOUNTER — ANESTHESIA EVENT (OUTPATIENT)
Dept: SURGERY | Facility: AMBULATORY SURGERY CENTER | Age: 57
End: 2022-07-11
Payer: COMMERCIAL

## 2022-07-12 ENCOUNTER — ANESTHESIA (OUTPATIENT)
Dept: SURGERY | Facility: AMBULATORY SURGERY CENTER | Age: 57
End: 2022-07-12
Payer: COMMERCIAL

## 2022-07-12 ENCOUNTER — HOSPITAL ENCOUNTER (OUTPATIENT)
Dept: NUCLEAR MEDICINE | Facility: CLINIC | Age: 57
Setting detail: NUCLEAR MEDICINE
Discharge: HOME | End: 2022-07-12
Attending: SURGERY
Payer: COMMERCIAL

## 2022-07-12 ENCOUNTER — HOSPITAL ENCOUNTER (OUTPATIENT)
Facility: AMBULATORY SURGERY CENTER | Age: 57
Discharge: HOME | End: 2022-07-12
Attending: SURGERY
Payer: COMMERCIAL

## 2022-07-12 VITALS
DIASTOLIC BLOOD PRESSURE: 64 MMHG | WEIGHT: 128 LBS | HEIGHT: 67 IN | HEART RATE: 97 BPM | TEMPERATURE: 96.6 F | SYSTOLIC BLOOD PRESSURE: 115 MMHG | BODY MASS INDEX: 20.09 KG/M2 | OXYGEN SATURATION: 95 % | RESPIRATION RATE: 12 BRPM

## 2022-07-12 DIAGNOSIS — E21.3 HYPERPARATHYROIDISM (H): ICD-10-CM

## 2022-07-12 DIAGNOSIS — E21.0 PRIMARY HYPERPARATHYROIDISM (H): ICD-10-CM

## 2022-07-12 LAB
PTH-INTACT SERPL-MCNC: 112 PG/ML (ref 15–65)
PTH-INTACT SERPL-MCNC: 41 PG/ML (ref 15–65)
PTH-INTACT SERPL-MCNC: 41 PG/ML (ref 15–65)

## 2022-07-12 PROCEDURE — 83970 ASSAY OF PARATHORMONE: CPT | Performed by: PATHOLOGY

## 2022-07-12 PROCEDURE — 343N000001 HC RX 343: Performed by: SURGERY

## 2022-07-12 PROCEDURE — A9500 TC99M SESTAMIBI: HCPCS | Performed by: SURGERY

## 2022-07-12 PROCEDURE — 78072 PARATHYRD PLANAR W/SPECT&CT: CPT

## 2022-07-12 PROCEDURE — 88331 PATH CONSLTJ SURG 1 BLK 1SPC: CPT | Mod: TC | Performed by: SURGERY

## 2022-07-12 PROCEDURE — 60500 EXPLORE PARATHYROID GLANDS: CPT | Mod: GC | Performed by: SURGERY

## 2022-07-12 PROCEDURE — 88305 TISSUE EXAM BY PATHOLOGIST: CPT | Mod: TC | Performed by: SURGERY

## 2022-07-12 PROCEDURE — 88331 PATH CONSLTJ SURG 1 BLK 1SPC: CPT | Mod: 26 | Performed by: PATHOLOGY

## 2022-07-12 PROCEDURE — 88305 TISSUE EXAM BY PATHOLOGIST: CPT | Mod: 26 | Performed by: PATHOLOGY

## 2022-07-12 PROCEDURE — 60500 EXPLORE PARATHYROID GLANDS: CPT

## 2022-07-12 PROCEDURE — 78072 PARATHYRD PLANAR W/SPECT&CT: CPT | Mod: 26 | Performed by: RADIOLOGY

## 2022-07-12 PROCEDURE — A9516 IODINE I-123 SOD IODIDE MIC: HCPCS | Performed by: SURGERY

## 2022-07-12 RX ORDER — SODIUM CHLORIDE, SODIUM LACTATE, POTASSIUM CHLORIDE, CALCIUM CHLORIDE 600; 310; 30; 20 MG/100ML; MG/100ML; MG/100ML; MG/100ML
INJECTION, SOLUTION INTRAVENOUS CONTINUOUS
Status: DISCONTINUED | OUTPATIENT
Start: 2022-07-12 | End: 2022-07-13 | Stop reason: HOSPADM

## 2022-07-12 RX ORDER — PROPOFOL 10 MG/ML
INJECTION, EMULSION INTRAVENOUS PRN
Status: DISCONTINUED | OUTPATIENT
Start: 2022-07-12 | End: 2022-07-12

## 2022-07-12 RX ORDER — ONDANSETRON 4 MG/1
4 TABLET, ORALLY DISINTEGRATING ORAL EVERY 30 MIN PRN
Status: DISCONTINUED | OUTPATIENT
Start: 2022-07-12 | End: 2022-07-13 | Stop reason: HOSPADM

## 2022-07-12 RX ORDER — SODIUM CHLORIDE, SODIUM LACTATE, POTASSIUM CHLORIDE, CALCIUM CHLORIDE 600; 310; 30; 20 MG/100ML; MG/100ML; MG/100ML; MG/100ML
INJECTION, SOLUTION INTRAVENOUS CONTINUOUS PRN
Status: DISCONTINUED | OUTPATIENT
Start: 2022-07-12 | End: 2022-07-12

## 2022-07-12 RX ORDER — DEXAMETHASONE SODIUM PHOSPHATE 10 MG/ML
10 INJECTION, SOLUTION INTRAMUSCULAR; INTRAVENOUS ONCE
Status: DISCONTINUED | OUTPATIENT
Start: 2022-07-12 | End: 2022-07-12 | Stop reason: HOSPADM

## 2022-07-12 RX ORDER — OXYCODONE HYDROCHLORIDE 5 MG/1
5 TABLET ORAL EVERY 4 HOURS PRN
Status: DISCONTINUED | OUTPATIENT
Start: 2022-07-12 | End: 2022-07-13 | Stop reason: HOSPADM

## 2022-07-12 RX ORDER — MEPERIDINE HYDROCHLORIDE 25 MG/ML
12.5 INJECTION INTRAMUSCULAR; INTRAVENOUS; SUBCUTANEOUS
Status: DISCONTINUED | OUTPATIENT
Start: 2022-07-12 | End: 2022-07-13 | Stop reason: HOSPADM

## 2022-07-12 RX ORDER — EPHEDRINE SULFATE 50 MG/ML
INJECTION, SOLUTION INTRAMUSCULAR; INTRAVENOUS; SUBCUTANEOUS PRN
Status: DISCONTINUED | OUTPATIENT
Start: 2022-07-12 | End: 2022-07-12

## 2022-07-12 RX ORDER — HYDROCODONE BITARTRATE AND ACETAMINOPHEN 5; 325 MG/1; MG/1
1 TABLET ORAL
Status: COMPLETED | OUTPATIENT
Start: 2022-07-12 | End: 2022-07-12

## 2022-07-12 RX ORDER — LIDOCAINE 40 MG/G
CREAM TOPICAL
Status: DISCONTINUED | OUTPATIENT
Start: 2022-07-12 | End: 2022-07-12 | Stop reason: HOSPADM

## 2022-07-12 RX ORDER — HYDROMORPHONE HYDROCHLORIDE 1 MG/ML
0.2 INJECTION, SOLUTION INTRAMUSCULAR; INTRAVENOUS; SUBCUTANEOUS EVERY 5 MIN PRN
Status: DISCONTINUED | OUTPATIENT
Start: 2022-07-12 | End: 2022-07-13 | Stop reason: HOSPADM

## 2022-07-12 RX ORDER — DEXAMETHASONE SODIUM PHOSPHATE 4 MG/ML
INJECTION, SOLUTION INTRA-ARTICULAR; INTRALESIONAL; INTRAMUSCULAR; INTRAVENOUS; SOFT TISSUE PRN
Status: DISCONTINUED | OUTPATIENT
Start: 2022-07-12 | End: 2022-07-12

## 2022-07-12 RX ORDER — PROPOFOL 10 MG/ML
INJECTION, EMULSION INTRAVENOUS CONTINUOUS PRN
Status: DISCONTINUED | OUTPATIENT
Start: 2022-07-12 | End: 2022-07-12

## 2022-07-12 RX ORDER — CYCLOBENZAPRINE HCL 5 MG
5 TABLET ORAL
COMMUNITY
Start: 2022-06-24 | End: 2022-11-09

## 2022-07-12 RX ORDER — ONDANSETRON 2 MG/ML
4 INJECTION INTRAMUSCULAR; INTRAVENOUS EVERY 30 MIN PRN
Status: DISCONTINUED | OUTPATIENT
Start: 2022-07-12 | End: 2022-07-13 | Stop reason: HOSPADM

## 2022-07-12 RX ORDER — FENTANYL CITRATE 50 UG/ML
25 INJECTION, SOLUTION INTRAMUSCULAR; INTRAVENOUS
Status: DISCONTINUED | OUTPATIENT
Start: 2022-07-12 | End: 2022-07-13 | Stop reason: HOSPADM

## 2022-07-12 RX ORDER — FENTANYL CITRATE 50 UG/ML
INJECTION, SOLUTION INTRAMUSCULAR; INTRAVENOUS PRN
Status: DISCONTINUED | OUTPATIENT
Start: 2022-07-12 | End: 2022-07-12

## 2022-07-12 RX ORDER — ASPIRIN 81 MG/1
81 TABLET, CHEWABLE ORAL
COMMUNITY

## 2022-07-12 RX ORDER — SCOLOPAMINE TRANSDERMAL SYSTEM 1 MG/1
1 PATCH, EXTENDED RELEASE TRANSDERMAL ONCE
Status: DISCONTINUED | OUTPATIENT
Start: 2022-07-12 | End: 2022-07-13 | Stop reason: HOSPADM

## 2022-07-12 RX ORDER — HYDROCODONE BITARTRATE AND ACETAMINOPHEN 5; 325 MG/1; MG/1
1 TABLET ORAL EVERY 6 HOURS PRN
Qty: 5 TABLET | Refills: 0 | Status: SHIPPED | OUTPATIENT
Start: 2022-07-12 | End: 2022-07-15

## 2022-07-12 RX ORDER — SODIUM CHLORIDE, SODIUM LACTATE, POTASSIUM CHLORIDE, CALCIUM CHLORIDE 600; 310; 30; 20 MG/100ML; MG/100ML; MG/100ML; MG/100ML
INJECTION, SOLUTION INTRAVENOUS CONTINUOUS
Status: DISCONTINUED | OUTPATIENT
Start: 2022-07-12 | End: 2022-07-12 | Stop reason: HOSPADM

## 2022-07-12 RX ORDER — LIDOCAINE HYDROCHLORIDE 20 MG/ML
INJECTION, SOLUTION INFILTRATION; PERINEURAL PRN
Status: DISCONTINUED | OUTPATIENT
Start: 2022-07-12 | End: 2022-07-12

## 2022-07-12 RX ORDER — ONDANSETRON 2 MG/ML
INJECTION INTRAMUSCULAR; INTRAVENOUS PRN
Status: DISCONTINUED | OUTPATIENT
Start: 2022-07-12 | End: 2022-07-12

## 2022-07-12 RX ORDER — FENTANYL CITRATE 50 UG/ML
25 INJECTION, SOLUTION INTRAMUSCULAR; INTRAVENOUS EVERY 5 MIN PRN
Status: DISCONTINUED | OUTPATIENT
Start: 2022-07-12 | End: 2022-07-13 | Stop reason: HOSPADM

## 2022-07-12 RX ADMIN — FENTANYL CITRATE 50 MCG: 50 INJECTION, SOLUTION INTRAMUSCULAR; INTRAVENOUS at 13:24

## 2022-07-12 RX ADMIN — ONDANSETRON 4 MG: 2 INJECTION INTRAMUSCULAR; INTRAVENOUS at 13:18

## 2022-07-12 RX ADMIN — PROPOFOL 200 MG: 10 INJECTION, EMULSION INTRAVENOUS at 13:08

## 2022-07-12 RX ADMIN — FENTANYL CITRATE 25 MCG: 50 INJECTION, SOLUTION INTRAMUSCULAR; INTRAVENOUS at 14:58

## 2022-07-12 RX ADMIN — Medication 23.5 MILLICURIE: at 10:22

## 2022-07-12 RX ADMIN — FENTANYL CITRATE 25 MCG: 50 INJECTION, SOLUTION INTRAMUSCULAR; INTRAVENOUS at 14:46

## 2022-07-12 RX ADMIN — LIDOCAINE HYDROCHLORIDE 100 MG: 20 INJECTION, SOLUTION INFILTRATION; PERINEURAL at 13:09

## 2022-07-12 RX ADMIN — Medication 100 MCG: at 13:37

## 2022-07-12 RX ADMIN — FENTANYL CITRATE 50 MCG: 50 INJECTION, SOLUTION INTRAMUSCULAR; INTRAVENOUS at 14:31

## 2022-07-12 RX ADMIN — SODIUM CHLORIDE, SODIUM LACTATE, POTASSIUM CHLORIDE, CALCIUM CHLORIDE: 600; 310; 30; 20 INJECTION, SOLUTION INTRAVENOUS at 13:00

## 2022-07-12 RX ADMIN — Medication 926 UCI.: at 07:15

## 2022-07-12 RX ADMIN — Medication 100 MCG: at 14:11

## 2022-07-12 RX ADMIN — FENTANYL CITRATE 25 MCG: 50 INJECTION, SOLUTION INTRAMUSCULAR; INTRAVENOUS at 14:50

## 2022-07-12 RX ADMIN — SODIUM CHLORIDE, SODIUM LACTATE, POTASSIUM CHLORIDE, CALCIUM CHLORIDE: 600; 310; 30; 20 INJECTION, SOLUTION INTRAVENOUS at 12:21

## 2022-07-12 RX ADMIN — HYDROCODONE BITARTRATE AND ACETAMINOPHEN 1 TABLET: 5; 325 TABLET ORAL at 14:47

## 2022-07-12 RX ADMIN — SODIUM CHLORIDE, SODIUM LACTATE, POTASSIUM CHLORIDE, CALCIUM CHLORIDE: 600; 310; 30; 20 INJECTION, SOLUTION INTRAVENOUS at 12:25

## 2022-07-12 RX ADMIN — PROPOFOL 125 MCG/KG/MIN: 10 INJECTION, EMULSION INTRAVENOUS at 13:12

## 2022-07-12 RX ADMIN — EPHEDRINE SULFATE 5 MG: 50 INJECTION, SOLUTION INTRAMUSCULAR; INTRAVENOUS; SUBCUTANEOUS at 13:58

## 2022-07-12 RX ADMIN — SCOLOPAMINE TRANSDERMAL SYSTEM 1 PATCH: 1 PATCH, EXTENDED RELEASE TRANSDERMAL at 12:22

## 2022-07-12 RX ADMIN — Medication 100 MCG: at 13:22

## 2022-07-12 RX ADMIN — ONDANSETRON 4 MG: 2 INJECTION INTRAMUSCULAR; INTRAVENOUS at 14:55

## 2022-07-12 RX ADMIN — DEXAMETHASONE SODIUM PHOSPHATE 4 MG: 4 INJECTION, SOLUTION INTRA-ARTICULAR; INTRALESIONAL; INTRAMUSCULAR; INTRAVENOUS; SOFT TISSUE at 13:18

## 2022-07-12 RX ADMIN — EPHEDRINE SULFATE 10 MG: 50 INJECTION, SOLUTION INTRAMUSCULAR; INTRAVENOUS; SUBCUTANEOUS at 14:06

## 2022-07-12 RX ADMIN — Medication 100 MCG: at 13:51

## 2022-07-12 NOTE — BRIEF OP NOTE
[facility] Brief Operative Note    Pre-operative diagnosis: Hyperparathyroidism (H) [E21.3]   Post-operative diagnosis Same   Procedure: Procedure(s):  PARATHYROIDECTOMY   Surgeon(s): Surgeon(s) and Role:     * Lori Ro MD - Primary   Estimated blood loss: 5 mL    Specimens: ID Type Source Tests Collected by Time Destination   1 : Left Inferior Parathyroid Tissue Parathyroid, Inferior, Left SURGICAL PATHOLOGY EXAM Lori Ro MD 7/12/2022  1:51 PM    A : Pre PTH Blood Foot, Left PARATHYROID HORMONE INTACT Lynette Kirby APRN CRNA 7/12/2022  1:16 PM    B : Post-PTH Blood Foot, Left PARATHYROID HORMONE INTACT Lynette Kirby APRN CRNA 7/12/2022  2:03 PM       Findings: Abnormal appearing left inferior parathyroid

## 2022-07-12 NOTE — ANESTHESIA CARE TRANSFER NOTE
Patient: Betsy Sutotn    Procedure: Procedure(s):  PARATHYROIDECTOMY       Diagnosis: Hyperparathyroidism (H) [E21.3]  Diagnosis Additional Information: No value filed.    Anesthesia Type:   General     Note:      Level of Consciousness: awake  Oxygen Supplementation: room air    Independent Airway: airway patency satisfactory and stable        Patient transferred to: PACU    Handoff Report: Identifed the Patient, Identified the Reponsible Provider, Reviewed the pertinent medical history, Discussed the surgical course, Reviewed Intra-OP anesthesia mangement and issues during anesthesia, Set expectations for post-procedure period and Allowed opportunity for questions and acknowledgement of understanding      Vitals:  Vitals Value Taken Time   BP     Temp     Pulse     Resp     SpO2         Electronically Signed By: DEREK Benítez CRNA  July 12, 2022  2:32 PM

## 2022-07-12 NOTE — ANESTHESIA PREPROCEDURE EVALUATION
Anesthesia Pre-Procedure Evaluation    Patient: Betsy Sutton   MRN: [id number] : 1965        Procedure : Procedure(s):  PARATHYROIDECTOMY          History reviewed. No pertinent past medical history.   History reviewed. No pertinent surgical history.   Allergies   Allergen Reactions     Codeine Other (See Comments)     Erythromycin      Penicillins Other (See Comments)     Sulfa Drugs Unknown      Social History     Tobacco Use     Smoking status: Never Smoker     Smokeless tobacco: Never Used   Substance Use Topics     Alcohol use: Not on file      Wt Readings from Last 1 Encounters:   22 58.1 kg (128 lb)        Anesthesia Evaluation            ROS/MED HX  ENT/Pulmonary:  - neg pulmonary ROS     Neurologic:  - neg neurologic ROS     Cardiovascular:  - neg cardiovascular ROS     METS/Exercise Tolerance:     Hematologic:  - neg hematologic  ROS     Musculoskeletal:  - neg musculoskeletal ROS     GI/Hepatic:  - neg GI/hepatic ROS     Renal/Genitourinary:  - neg Renal ROS     Endo:  - neg endo ROS     Psychiatric/Substance Use:  - neg psychiatric ROS     Infectious Disease:  - neg infectious disease ROS     Malignancy:  - neg malignancy ROS     Other:  - neg other ROS             OUTSIDE LABS:  CBC: No results found for: WBC, HGB, HCT, PLT  BMP: No results found for: NA, POTASSIUM, CHLORIDE, CO2, BUN, CR, GLC  COAGS: No results found for: PTT, INR, FIBR  POC: No results found for: BGM, HCG, HCGS  HEPATIC:   Lab Results   Component Value Date    ALBUMIN 4.2 2022     OTHER:   Lab Results   Component Value Date    IRMA 10.4 (H) 2022    PHOS 3.8 2022    TSH 2.15 2022       Anesthesia Plan    ASA Status:  2      Anesthesia Type: General.     - Airway: ETT   Induction: Intravenous.   Maintenance: TIVA.        Consents    Anesthesia Plan(s) and associated risks, benefits, and realistic alternatives discussed. Questions answered and patient/representative(s) expressed understanding.      - Discussed: Risks, Benefits and Alternatives for BOTH SEDATION and the PROCEDURE were discussed     - Discussed with:  Patient      - Extended Intubation/Ventilatory Support Discussed: No.      - Patient is DNR/DNI Status: No    Use of blood products discussed: No .     Postoperative Care       PONV prophylaxis: Ondansetron (or other 5HT-3), Scopolamine patch, Dexamethasone or Solumedrol     Comments:                Chandler Bonilla MD, MD

## 2022-07-12 NOTE — DISCHARGE INSTRUCTIONS
-Follow up appointment with Dr Ro in about 2 weeks.  - Incision site(s): Keep dressing/incision clean/dry/intact for 24 hours. 24 hours after your operation, you may shower. You have surgical glue over your incision, this will wear off in time. No submersion in water (lake/pool/tub) for one month or until approved by your surgeon. There are two small tails of suture on either end of your incision; this will be trimmed at your follow-up appointment if still present.   - If you develop any fever/chills, difficulty breathing, worsening pain, redness, swelling, nausea or vomiting, tingling of your hands or around your lips, or drainage from your wound please call Dr Ro at [phone]. If unable to reach her, call [phone] and ask to page the Surgical Oncology resident on call.   Ok to take acetaminophen or ibuprofen if your pain is not severe enough for narcotic medication.   -No driving while taking narcotic pain medication.   -It is recommendable to take miralax while on narcotic pain medication to prevent constipation.         [facility]  Home Care Following Anesthesia  For 24 hours after surgery:  Get plenty of rest.  A responsible adult must stay with you for at least 24 hours after you leave the surgery center.  Do not drive or use heavy equipment.  If you have weakness or tingling, don't drive or use heavy equipment until this feeling goes away.   Do not drink alcohol.   Avoid strenuous or risky activities.  Ask for help when climbing stairs.  You may feel lightheaded.  IF so, sit for a few minutes before standing.  Have someone help you get up.   If you have nausea (feel sick to your stomach): Drink only clear liquids such as apple juice, ginger ale, broth or 7-Up.  Rest may also help.  Be sure to drink enough fluids.  Move to a regular diet as you feel able.   You may have a slight fever.  Call the doctor if your fever is over 100 F (37.7 C) (taken under  the tongue) or lasts longer than 24 hours.  You may have a dry mouth, a sore throat, muscle aches or trouble sleeping. These should go away after 24 hours.  Do not make important or legal decisions.   It is recommended to avoid smoking.               Tips for taking pain medications  To get the best pain relief possible, remember these points:  Take pain medications as directed, before pain becomes severe.  Pain medication can upset your stomach: taking it with food may help.  Constipation is a common side effect of pain medication. Drink plenty of  fluids.  Eat foods high in fiber. Take a stool softener if recommended by your doctor or pharmacist.  Do not drink alcohol, drive or operate machinery while taking pain medications.  Ask about other ways to control pain, such as with heat, ice or relaxation.    Tylenol/Acetaminophen Consumption  To help encourage the safe use of acetaminophen, the makers of TYLENOL  have lowered the maximum daily dose for single-ingredient Extra Strength TYLENOL  (acetaminophen) products sold in the [place] from 8 pills per day (4,000 mg) to 6 pills per day (3,000 mg). The dosing interval has also changed from 2 pills every 4-6 hours to 2 pills every 6 hours.  If you feel your pain relief is insufficient, you may take Tylenol/Acetaminophen in addition to your narcotic pain medication.   Be careful not to exceed 3,000 mg of Tylenol/Acetaminophen in a 24 hour period from all sources.  If you are taking extra strength Tylenol/acetaminophen (500 mg), the maximum dose is 6 tablets in 24 hours.  If you are taking regular strength acetaminophen (325 mg), the maximum dose is 9 tablets in 24 hours.    Call a doctor for any of the following:  Signs of infection (fever, growing tenderness at the surgery site, a large amount of drainage or bleeding, severe pain, foul-smelling drainage, redness, swelling).  It has been over 8 to 10 hours since surgery and you are still not able to urinate (pass  water).  Headache for over 24 hours.  Numbness, tingling or weakness the day after surgery (if you had spinal anesthesia).  Signs of Covid-19 infection (temperature over 100 degrees, shortness of breath, cough, loss of taste/smell, generalized body aches, persistent headache, chills, sore throat, nausea/vomiting/diarrhea)  Your doctor is:  Dr. Lori Ro, ENT Otolaryngology: [phone]                    Or dial [phone] and ask for the resident on call for:  ENT Otolaryngology  For emergency care, call the:  [place] [facility]:  [phone] ([facility] for hearing impaired: [phone])

## 2022-07-17 LAB
PATH REPORT.COMMENTS IMP SPEC: NORMAL
PATH REPORT.COMMENTS IMP SPEC: NORMAL
PATH REPORT.FINAL DX SPEC: NORMAL
PATH REPORT.GROSS SPEC: NORMAL
PATH REPORT.INTRAOP OBS SPEC DOC: NORMAL
PATH REPORT.MICROSCOPIC SPEC OTHER STN: NORMAL
PATH REPORT.RELEVANT HX SPEC: NORMAL
PHOTO IMAGE: NORMAL

## 2022-07-22 NOTE — OP NOTE
Procedure Date: 07/12/2022    PREOPERATIVE DIAGNOSIS:  Primary hyperparathyroidism.    POSTOPERATIVE DIAGNOSIS:  Primary hyperparathyroidism.    SURGICAL PROCEDURE:  Neck exploration and resection of left inferior parathyroid adenoma.    SURGEON:  Lori Ro MD    ASSISTANT:  Aashish Lynn MD    ANESTHESIA:  General endotracheal with nerve monitoring endotracheal tube.    COMPLICATIONS:  None.    ESTIMATED BLOOD LOSS:  Less than 5 mL.    Preincision parathyroid hormone level 112; 20 minute post excision parathyroid hormone level 41.    CLINICAL INDICATIONS FOR THE PROCEDURE:  This is a 56-year-old female noted to be hypercalcemic.  A workup ensued that confirmed primary hyperparathyroidism.  A localizing study localized the parathyroid adenoma to the left inferior position.  Based on this, it was recommended the patient undergo a neck exploration and resection of parathyroid adenoma or adenomas.  The surgical procedure was discussed with the patient, including, but not limited to, the risks of bleeding, infection, injury to the recurrent laryngeal nerve or nerves, potential permanent hypocalcemia or loss of airway.  The patient was aware of this and agreed to proceed with surgery.  Consent was obtained, and the site was marked.    DETAILS OF THE PROCEDURE:  The patient was brought to the operating room in stable condition and placed on the operating table in supine position.  After appropriate general anesthesia was obtained, the patient was prepped and draped in a sterile fashion.  A time out was then performed.  A 3 cm incision was made over the anterior neck following a natural skin crease.  The platysma was then divided, and subplatysmal planes were then created.  The strap muscles were divided at the midline and retracted laterally.  Based on the localizing study, we evaluated the left neck first.  We actually had a hard time finding the left inferior parathyroid gland; therefore, it did not appear to  be in the normal position.  We then identified the left recurrent laryngeal nerve.  We followed this cephalad and identified a normal-appearing left superior parathyroid gland.  We then inspected the right neck and identified a normal-appearing right inferior and right superior parathyroid gland and confirmed that the right recurrent laryngeal nerve was intact visibly as well as with nerve stimulation prior to reevaluating the left neck.  We then carried our dissection laterally to the carotid sheath, inferiorly to the palpation of the innominate vasculature and then medially.  In the superior thymic horn was an enlarged left parathyroid gland.  This was dissected, its blood supply was clipped, and the specimen was sent for frozen section.  Pathology confirmed that this indeed was a hypercellular parathyroid gland.  We confirmed that the left recurrent laryngeal nerve was intact visually as well as with nerve stimulation.  We localized all 4 parathyroid glands and had an appropriate drop in the parathyroid hormone level.  Fibrillar was then placed in the operative bed.  The strap muscles were then approximated at the midline using a 3-0 chromic interrupted suture.  The platysma was approximated using a 3-0 chromic interrupted suture.  The skin incision was approximated using a 5-0 Monocryl running subcuticular suture.  The wound was dressed with Dermabond.  The patient was then extubated and returned to the recovery room in stable condition.  I was present during the entire surgical procedure.    Lori Ro MD        D: 2022   T: 2022   MT: rosetta    Name:     JONG WASHINGTON  MRN:      [id number]        Account:        [id number]   :      1965           Procedure Date: 2022     Document: [id number]

## 2022-07-25 ENCOUNTER — LAB (OUTPATIENT)
Dept: LAB | Facility: CLINIC | Age: 57
End: 2022-07-25

## 2022-07-25 ENCOUNTER — OFFICE VISIT (OUTPATIENT)
Dept: OTOLARYNGOLOGY | Facility: CLINIC | Age: 57
End: 2022-07-25
Payer: COMMERCIAL

## 2022-07-25 VITALS
SYSTOLIC BLOOD PRESSURE: 136 MMHG | BODY MASS INDEX: 20.4 KG/M2 | TEMPERATURE: 97.8 F | DIASTOLIC BLOOD PRESSURE: 79 MMHG | HEART RATE: 71 BPM | HEIGHT: 67 IN | WEIGHT: 130 LBS

## 2022-07-25 DIAGNOSIS — Z90.89 S/P PARATHYROIDECTOMY: ICD-10-CM

## 2022-07-25 DIAGNOSIS — Z98.890 S/P PARATHYROIDECTOMY: ICD-10-CM

## 2022-07-25 DIAGNOSIS — Z90.89 S/P PARATHYROIDECTOMY: Primary | ICD-10-CM

## 2022-07-25 DIAGNOSIS — Z98.890 S/P PARATHYROIDECTOMY: Primary | ICD-10-CM

## 2022-07-25 LAB
CALCIUM SERPL-MCNC: 9.1 MG/DL (ref 8.5–10.1)
PTH-INTACT SERPL-MCNC: 54 PG/ML (ref 15–65)

## 2022-07-25 PROCEDURE — 99024 POSTOP FOLLOW-UP VISIT: CPT | Performed by: SURGERY

## 2022-07-25 PROCEDURE — 36415 COLL VENOUS BLD VENIPUNCTURE: CPT | Performed by: PATHOLOGY

## 2022-07-25 PROCEDURE — 83970 ASSAY OF PARATHORMONE: CPT | Performed by: PATHOLOGY

## 2022-07-25 PROCEDURE — 82310 ASSAY OF CALCIUM: CPT | Performed by: PATHOLOGY

## 2022-07-25 ASSESSMENT — PAIN SCALES - GENERAL: PAINLEVEL: MILD PAIN (3)

## 2022-07-25 NOTE — NURSING NOTE
"Chief Complaint   Patient presents with     RECHECK     Post op      Blood pressure 136/79, pulse 71, temperature 97.8  F (36.6  C), height 1.702 m (5' 7\"), weight 59 kg (130 lb).    Corey Piedra LPN    "

## 2022-07-25 NOTE — PROGRESS NOTES
2 week post op check s/p resection left inferior parathyroid adenoma    Since the surgery, the patient denies any problems with voice quality, inspiration or swallowing. No sx of hypocalcemia    PE:  Wound is healing well Dermabond removed    Pathology and intraop PTH levels discussed w patient    Plan  Check PTH, calcium today  Reviewed wound management and massage  Follow up as needed    Lori Ro MD

## 2022-07-25 NOTE — LETTER
7/25/2022       RE: Betsy Sutton  [address] [address] [facility] [address]     Dear Colleague,    Thank you for referring your patient, Betsy Sutton, to the [facility] [place] at [facility]. Please see a copy of my visit note below.    2 week post op check s/p resection left inferior parathyroid adenoma    Since the surgery, the patient denies any problems with voice quality, inspiration or swallowing. No sx of hypocalcemia    PE:  Wound is healing well Dermabond removed    Pathology and intraop PTH levels discussed w patient    Plan  Check PTH, calcium today  Reviewed wound management and massage  Follow up as needed    Lori Ro MD

## 2022-07-27 NOTE — ADDENDUM NOTE
Addendum  created 07/27/22 1501 by Chandler Bonilla MD    Attestation recorded in Intraprocedure, Intraprocedure Attestations filed

## 2022-10-03 ENCOUNTER — HEALTH MAINTENANCE LETTER (OUTPATIENT)
Age: 57
End: 2022-10-03

## 2022-10-04 NOTE — PROGRESS NOTES
"SURGERY CLINIC CONSULTATION    REASON FOR CONSULTATION:  Betsy Sutton was referred by Dr. Guerrero for evaluation and discussion of treatment options for HPTH     HISTORY OF PRESENT ILLNESS:  Betsy Sutton is a 56 year old female who has a history of hypercalcemia since 2014. Most recent labs: Ca 10.4, PTH 91, UkCa 220mg/24hrs, Vitd D 21.     Symptoms associated with HPTH include CT-nehpriliathiasis, Osteoporosis. Muscle aches and fatigue. No family or previous h/o HPTH.        REVIEW OF SYSTEMS:  ROS EXAM: 10pt ROS pertinent for that noted in HPI  Patient Active Problem List   Diagnosis     Hyperparathyroidism (H)       Past Surgical History:   Procedure Laterality Date     PARATHYROIDECTOMY N/A 7/12/2022    Procedure: PARATHYROIDECTOMY;  Surgeon: Lori Ro MD;  Location: [facility] [place]       Allergies   Allergen Reactions     Codeine Other (See Comments)     Erythromycin      Penicillins Other (See Comments)     Sulfa Drugs Unknown       Medications reviewed in EMR        No family history on file.     PHYSICAL EXAM:  BP (!) 143/110   Pulse 89   Ht 1.702 m (5' 7\")   Wt 58.8 kg (129 lb 9.6 oz)   SpO2 99%   BMI 20.30 kg/m      Neck: Thyroid gland normal size and without nodules. No cervical lymphadenopathy    I personally reviewed the radiographic images and laboratory data    ASSESSMENT:   1. Primary hyperparathyroidism (H)        PLAN:   I would first like to get localizing scan (Nuc Med). Then recommend neck exploration resection parathyroid adenoma. The procedure and risks were discussed with the patient. Will schedule for surgery.    Lori Ro MD            "

## 2022-11-09 ENCOUNTER — OFFICE VISIT (OUTPATIENT)
Dept: ENDOCRINOLOGY | Facility: CLINIC | Age: 57
End: 2022-11-09
Payer: COMMERCIAL

## 2022-11-09 VITALS
DIASTOLIC BLOOD PRESSURE: 87 MMHG | SYSTOLIC BLOOD PRESSURE: 134 MMHG | BODY MASS INDEX: 18.97 KG/M2 | WEIGHT: 121.1 LBS | OXYGEN SATURATION: 98 % | HEART RATE: 78 BPM

## 2022-11-09 DIAGNOSIS — M81.0 OSTEOPOROSIS OF MULTIPLE SITES: ICD-10-CM

## 2022-11-09 DIAGNOSIS — R25.2 CRAMP OF BOTH LOWER EXTREMITIES: ICD-10-CM

## 2022-11-09 DIAGNOSIS — R00.2 PALPITATIONS: ICD-10-CM

## 2022-11-09 DIAGNOSIS — Z86.39 HISTORY OF PRIMARY HYPERPARATHYROIDISM: Primary | ICD-10-CM

## 2022-11-09 PROCEDURE — 99214 OFFICE O/P EST MOD 30 MIN: CPT | Performed by: INTERNAL MEDICINE

## 2022-11-09 RX ORDER — METHYLPHENIDATE HYDROCHLORIDE 27 MG/1
27 TABLET, EXTENDED RELEASE ORAL DAILY
COMMUNITY
Start: 2022-09-20 | End: 2024-07-09

## 2022-11-09 RX ORDER — CALCIUM CARBONATE 500 MG/1
1 TABLET, CHEWABLE ORAL PRN
COMMUNITY
End: 2022-11-09

## 2022-11-09 RX ORDER — IBUPROFEN 200 MG
2 CAPSULE ORAL 2 TIMES DAILY
Qty: 360 TABLET | Refills: 3 | Status: SHIPPED | OUTPATIENT
Start: 2022-11-09 | End: 2023-06-28

## 2022-11-09 NOTE — PATIENT INSTRUCTIONS
[facility]-Department of Endocrinology  Clinic Nurse: HERBER Pinto  CMA's: Giovanna   LPN: Padmini  Scheduling/Clinic phone number : [phone]   Clinic Fax: [phone]  On-Call Endocrine at [facility] (after hours/weekends): [phone] option 4    Please call the number below to schedule your labs.    [facility] [phone]   [facility] [phone]   [place] [phone]   [facility]  [phone]   [facility] [phone]   [facility] [phone]   [facility]) [phone]   [facility]   [place] [phone]   [place] [phone]   [place] [phone]   [place] [phone]     Please reach out to the following centers to schedule your imaging appointment:     Imaging (DEXA, CT, MRI, XRAY)    [facility] ([facility], [facility]) [phone]   [facility] ([place], [place]) [phone]   [facility] ([facility]) [phone]   [facility] ([facility]) [phone]

## 2022-11-09 NOTE — LETTER
11/9/2022         RE: Betsy Sutton  [address] [address] [address]        Dear Colleague,    Thank you for referring your patient, Betsy Sutton, to the [facility]. Please see a copy of my visit note below.    The patient is seen in follow-up.  She was first seen in our clinic in May 2022.    Betsy Sutton is a 56 year old female with a past medical history significant for migraine, Ménière disease, cervical radiculopathy and CVA.     The patient has been experiencing intermittent episodes of mild hypercalcemia since 2014, with a maximum documented calcium level of 10.7, on 3/15/2022, when parathyroid hormone level was elevated at 94 (15-65) and alkaline phosphatase was also mildly elevated at 123 ().  Prior parathyroid hormone level was elevated at 78 (14-72), in 2021 and normal in 2014 and 2015.  GFR has been normal.  Vitamin D level was 20.1, on 4/1/2022.   The abdominal CT from April 2022 revealed a nonobstructing 5 mm stone in the mid left kidney.  She had a DEXA scan done in April 2022 at [facility], with the following T-scores: -3.2 at the arm, -2.7 at the lumbar spine, -2.2 and -3.2 at the hip.  The patient experienced one episode of abdominal pain which occurred in the beginning of 2022, when she thought she might had passed a kidney stone.  Otherwise, she has not been aware of kidney stones until the recent abdominal CT from April 2022.    At her first visit in our clinic, she endorsed fatigue, worsening anxiety, some difficulty finding words and back pain, but no other symptoms which can be attributed to hypercalcemia.  She also endorsed new episodes of hot flashes (different from the hot flashes she experienced at the time of menopause), and her blood pressure was elevated. Lab work from 6/7/2022 revealed normal urinary metanephrines, 24-hour urinary calcium 0.26 g (3 l urine volume), serum calcium 10.4, PTH 91.    The parathyroid scan  from 7/12/2022 revealed a focal area of abnormal lack uptake located inferior to the left thyroid lobe.  The patient underwent parathyroidectomy on 7/12/2022, with Dr. Ro..  The left inferior parathyroid gland was removed.  I reviewed the pathology report.  It revealed a parathyroid adenoma and thymic remnant, 0.4 g total weight.  During the surgery, the PTH level decreased from 112-41.  On the most recent lab work from 7/25/2022, PTH was 54, calcium was normal at 9.1.    Following surgery, the patient did notice significantly improved energy levels.  According to her  and son, her memory and clarity got better.  Since surgery, she has been experiencing muscle cramps in her feet and call us, several times a week.  She has not had them prior to surgery.  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  The numbness and the tingling sensation in her hands, related to her neck issues, has remained unchanged.  Denies experiencing significant hot flashes since surgery.    She has been taking Citrical calcium, 200 mg twice daily, with a total daily dose of calcium of 12.5 mcg.  Daily intake of dairy products remains minimal, mainly cheese.  She has not been taking multivitamins.  Physical activity consists of walking her dogs for 30 minutes daily.  At school, she is constantly walking.  Her weight is down 8 pounds since June this year.  Intermittently, her blood pressure remains mildly elevated.    Other pertinent labs reviewed:  5/2022 TSH 2.15   6/17/2021 Free T4 0.86, TSH 2.2  2013 total testosterone normal at 28 (14-76)    Past Medical History   Migraines   Meniere disease   CVA at age 36 - manifested with dizziness and severe headache   Factor 8 slightly elevated   Cervical radiculopathy   No fractures     Past Surgical History   Past Surgical History:   Procedure Laterality Date     PARATHYROIDECTOMY N/A 7/12/2022     Procedure: PARATHYROIDECTOMY;  Surgeon: Lori Ro MD;  Location: [facility] OR       Current Medications    Current Outpatient Medications:      aspirin (ASA) 81 MG chewable tablet, Take 81 mg by mouth, Disp: , Rfl:      cyclobenzaprine (FLEXERIL) 5 MG tablet, Take 5 mg by mouth, Disp: , Rfl:      cyclobenzaprine (FLEXERIL) 5 MG tablet, Take 5 mg by mouth, Disp: , Rfl:      ubrogepant (UBRELVY) 100 MG tablet, TAKE 1 TAB BY MOUTH ONCE, MAY REPEAT ONCE AFTER 2 HRS IF NEEDED, NOT TO EXCEED 200 MG IN 24 HRS, Disp: , Rfl:     Family History   Brother - thyroid cancer. Paternal grandfather CVA.     Social History  . She has 2 children. She denies smoking; she occasionally drinks alcohol. Denies using illicit drugs. 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              Vital Signs     Previous Weights:    Wt Readings from Last 10 Encounters:   11/09/22 54.9 kg (121 lb 1.6 oz)   07/25/22 59 kg (130 lb)   07/12/22 58.1 kg (128 lb)   06/09/22 58.8 kg (129 lb 9.6 oz)        /87 (BP Location: Left arm, Patient Position: Sitting, Cuff Size: Adult Regular)   Pulse 78   Wt 54.9 kg (121 lb 1.6 oz)   SpO2 98%   BMI 18.97 kg/m      General appearance: she is thin, pleasant, no distress noted  Eyes: conjunctivae and extraocular motions are normal. Pupils are equal, round, and reactive to light. No lid lag, no stare.  HENT: oropharynx clear and moist; neck no JVD, no bruits, no thyromegaly, no palpable nodules  Cardiovascular: regular rhythm, no murmurs, distal pulses palpable, no edema  Respiratory: chest clear, no rales, no rhonchi  Musculoskeletal: normal tone and strength   Neurologic: reflexes normal and symmetric, no resting tremor  Psychiatric: affect and judgment normal   Skin: warm, no lesions     Lab Results  I reviewed prior lab results documented in Epic.  TSH   Date Value Ref Range Status   05/12/2022 2.15 0.40 - 4.00 mU/L Final     Assessment     Betsy Sutton is a  56 year old female with a past medical history significant for migraine, Ménière disease, cervical radiculopathy and CVA, seen for evaluation of primary hyperparathyroidism.    1.  Primary hyperparathyroidism  The patient has been experiencing intermittent episodes of hypercalcemia since 2014.  The diagnosis was confirmed in 2022 and she underwent left inferior parathyroidectomy in July 2022.  Following surgery, the patient reports feeling better, with better energy levels.  She did develop new muscle cramps.  Her calcium level and parathyroid hormone level normalized.  Prior imaging revealed a silent kidney stone and a new diagnosis of osteoporosis.    Recommendations:  Increase the dose of calcitriol to 400 mg twice daily.  This should provide 25 mcg vitamin D daily.  Check calcium, phosphorus, albumin, PTH and vitamin D level in 2 months    2.  Osteoporosis  She has not fractured bones  Plan:  Continue to take calcium and vitamin D as above  Continue to walk daily, for at least 30 minutes and add weightbearing exercises  F/up DXA scan in July next year.  Advised the patient to schedule the DEXA scan at [place].  If the bone mineral density does not improve following parathyroidectomy, the plan is consider active medications for osteoporosis.    3.  Muscle cramps, of unclear etiology  Follow-up magnesium level with her next lab work.    4.  Palpitations  Follow-up reflex TSH     Orders Placed This Encounter   Procedures     Dexa hip/pelvis/spine     Dexa Wrist/Heel     TSH with free T4 reflex     25 Hydroxyvitamin D2 and D3     Albumin level     Basic metabolic panel     Parathyroid Hormone Intact     Phosphorus     Magnesium                               Again, thank you for allowing me to participate in the care of your patient.        Sincerely,        Lisa Guerrero MD

## 2022-11-09 NOTE — PROGRESS NOTES
The patient is seen in follow-up.  She was first seen in our clinic in May 2022.    Betsy Sutton is a 56 year old female with a past medical history significant for migraine, Ménière disease, cervical radiculopathy and CVA.     The patient has been experiencing intermittent episodes of mild hypercalcemia since 2014, with a maximum documented calcium level of 10.7, on 3/15/2022, when parathyroid hormone level was elevated at 94 (15-65) and alkaline phosphatase was also mildly elevated at 123 ().  Prior parathyroid hormone level was elevated at 78 (14-72), in 2021 and normal in 2014 and 2015.  GFR has been normal.  Vitamin D level was 20.1, on 4/1/2022.   The abdominal CT from April 2022 revealed a nonobstructing 5 mm stone in the mid left kidney.  She had a DEXA scan done in April 2022 at [facility], with the following T-scores: -3.2 at the arm, -2.7 at the lumbar spine, -2.2 and -3.2 at the hip.  The patient experienced one episode of abdominal pain which occurred in the beginning of 2022, when she thought she might had passed a kidney stone.  Otherwise, she has not been aware of kidney stones until the recent abdominal CT from April 2022.    At her first visit in our clinic, she endorsed fatigue, worsening anxiety, some difficulty finding words and back pain, but no other symptoms which can be attributed to hypercalcemia.  She also endorsed new episodes of hot flashes (different from the hot flashes she experienced at the time of menopause), and her blood pressure was elevated. Lab work from 6/7/2022 revealed normal urinary metanephrines, 24-hour urinary calcium 0.26 g (3 l urine volume), serum calcium 10.4, PTH 91.    The parathyroid scan from 7/12/2022 revealed a focal area of abnormal lack uptake located inferior to the left thyroid lobe.  The patient underwent parathyroidectomy on 7/12/2022, with Dr. Ro..  The left inferior parathyroid gland was removed.  I reviewed the pathology  report.  It revealed a parathyroid adenoma and thymic remnant, 0.4 g total weight.  During the surgery, the PTH level decreased from 112-41.  On the most recent lab work from 7/25/2022, PTH was 54, calcium was normal at 9.1.    Following surgery, the patient did notice significantly improved energy levels.  According to her  and son, her memory and clarity got better.  Since surgery, she has been experiencing muscle cramps in her feet and call us, several times a week.  She has not had them prior to surgery.  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  The numbness and the tingling sensation in her hands, related to her neck issues, has remained unchanged.  Denies experiencing significant hot flashes since surgery.    She has been taking Citrical calcium, 200 mg twice daily, with a total daily dose of calcium of 12.5 mcg.  Daily intake of dairy products remains minimal, mainly cheese.  She has not been taking multivitamins.  Physical activity consists of walking her dogs for 30 minutes daily.  At school, she is constantly walking.  Her weight is down 8 pounds since June this year.  Intermittently, her blood pressure remains mildly elevated.    Other pertinent labs reviewed:  5/2022 TSH 2.15   6/17/2021 Free T4 0.86, TSH 2.2  2013 total testosterone normal at 28 (14-76)    Past Medical History   Migraines   Meniere disease   CVA at age 36 - manifested with dizziness and severe headache   Factor 8 slightly elevated   Cervical radiculopathy   No fractures     Past Surgical History   Past Surgical History:   Procedure Laterality Date     PARATHYROIDECTOMY N/A 7/12/2022    Procedure: PARATHYROIDECTOMY;  Surgeon: Lori Ro MD;  Location: [facility] OR       Current Medications    Current Outpatient Medications:      aspirin (ASA) 81 MG chewable tablet, Take 81 mg by mouth, Disp: , Rfl:      cyclobenzaprine (FLEXERIL) 5 MG  tablet, Take 5 mg by mouth, Disp: , Rfl:      cyclobenzaprine (FLEXERIL) 5 MG tablet, Take 5 mg by mouth, Disp: , Rfl:      ubrogepant (UBRELVY) 100 MG tablet, TAKE 1 TAB BY MOUTH ONCE, MAY REPEAT ONCE AFTER 2 HRS IF NEEDED, NOT TO EXCEED 200 MG IN 24 HRS, Disp: , Rfl:     Family History   Brother - thyroid cancer. Paternal grandfather CVA.     Social History  . She has 2 children. She denies smoking; she occasionally drinks alcohol. Denies using illicit drugs. 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              Vital Signs     Previous Weights:    Wt Readings from Last 10 Encounters:   11/09/22 54.9 kg (121 lb 1.6 oz)   07/25/22 59 kg (130 lb)   07/12/22 58.1 kg (128 lb)   06/09/22 58.8 kg (129 lb 9.6 oz)        /87 (BP Location: Left arm, Patient Position: Sitting, Cuff Size: Adult Regular)   Pulse 78   Wt 54.9 kg (121 lb 1.6 oz)   SpO2 98%   BMI 18.97 kg/m      General appearance: she is thin, pleasant, no distress noted  Eyes: conjunctivae and extraocular motions are normal. Pupils are equal, round, and reactive to light. No lid lag, no stare.  HENT: oropharynx clear and moist; neck no JVD, no bruits, no thyromegaly, no palpable nodules  Cardiovascular: regular rhythm, no murmurs, distal pulses palpable, no edema  Respiratory: chest clear, no rales, no rhonchi  Musculoskeletal: normal tone and strength   Neurologic: reflexes normal and symmetric, no resting tremor  Psychiatric: affect and judgment normal   Skin: warm, no lesions     Lab Results  I reviewed prior lab results documented in Epic.  TSH   Date Value Ref Range Status   05/12/2022 2.15 0.40 - 4.00 mU/L Final     Assessment     Betsy Sutton is a 56 year old female with a past medical history significant for migraine, Ménière disease, cervical radiculopathy and CVA, seen for evaluation of primary hyperparathyroidism.    1.  Primary hyperparathyroidism  The patient has been experiencing intermittent  episodes of hypercalcemia since 2014.  The diagnosis was confirmed in 2022 and she underwent left inferior parathyroidectomy in July 2022.  Following surgery, the patient reports feeling better, with better energy levels.  She did develop new muscle cramps.  Her calcium level and parathyroid hormone level normalized.  Prior imaging revealed a silent kidney stone and a new diagnosis of osteoporosis.    Recommendations:  Increase the dose of calcitriol to 400 mg twice daily.  This should provide 25 mcg vitamin D daily.  Check calcium, phosphorus, albumin, PTH and vitamin D level in 2 months    2.  Osteoporosis  She has not fractured bones  Plan:  Continue to take calcium and vitamin D as above  Continue to walk daily, for at least 30 minutes and add weightbearing exercises  F/up DXA scan in July next year.  Advised the patient to schedule the DEXA scan at [place].  If the bone mineral density does not improve following parathyroidectomy, the plan is consider active medications for osteoporosis.    3.  Muscle cramps, of unclear etiology  Follow-up magnesium level with her next lab work.    4.  Palpitations  Follow-up reflex TSH     Orders Placed This Encounter   Procedures     Dexa hip/pelvis/spine     Dexa Wrist/Heel     TSH with free T4 reflex     25 Hydroxyvitamin D2 and D3     Albumin level     Basic metabolic panel     Parathyroid Hormone Intact     Phosphorus     Magnesium

## 2022-11-09 NOTE — NURSING NOTE
Betsy Sutton's goals for this visit include:   Chief Complaint   Patient presents with     Endocrine Problem     Hyperparathyroidism, hot flashes     She requests these members of her care team be copied on today's visit information: No    PCP: Lesch, Anthony    Referring Provider:  No referring provider defined for this encounter.    /87 (BP Location: Left arm, Patient Position: Sitting, Cuff Size: Adult Regular)   Pulse 78   Wt 54.9 kg (121 lb 1.6 oz)   SpO2 98%   BMI 18.97 kg/m      Do you need any medication refills at today's visit? No

## 2023-01-12 ENCOUNTER — LAB (OUTPATIENT)
Dept: LAB | Facility: CLINIC | Age: 58
End: 2023-01-12
Payer: COMMERCIAL

## 2023-01-12 DIAGNOSIS — R25.2 CRAMP OF BOTH LOWER EXTREMITIES: ICD-10-CM

## 2023-01-12 DIAGNOSIS — M81.0 OSTEOPOROSIS OF MULTIPLE SITES: ICD-10-CM

## 2023-01-12 DIAGNOSIS — R00.2 PALPITATIONS: ICD-10-CM

## 2023-01-12 LAB
ALBUMIN SERPL-MCNC: 4.4 G/DL (ref 3.4–5)
ANION GAP SERPL CALCULATED.3IONS-SCNC: 4 MMOL/L (ref 3–14)
BUN SERPL-MCNC: 15 MG/DL (ref 7–30)
CALCIUM SERPL-MCNC: 9.7 MG/DL (ref 8.5–10.1)
CHLORIDE BLD-SCNC: 107 MMOL/L (ref 94–109)
CO2 SERPL-SCNC: 31 MMOL/L (ref 20–32)
CREAT SERPL-MCNC: 0.76 MG/DL (ref 0.52–1.04)
GFR SERPL CREATININE-BSD FRML MDRD: >90 ML/MIN/1.73M2
GLUCOSE BLD-MCNC: 91 MG/DL (ref 70–99)
MAGNESIUM SERPL-MCNC: 2.1 MG/DL (ref 1.6–2.3)
PHOSPHATE SERPL-MCNC: 3.6 MG/DL (ref 2.5–4.5)
POTASSIUM BLD-SCNC: 3.9 MMOL/L (ref 3.4–5.3)
PTH-INTACT SERPL-MCNC: 34 PG/ML (ref 15–65)
SODIUM SERPL-SCNC: 142 MMOL/L (ref 133–144)
TSH SERPL DL<=0.005 MIU/L-ACNC: 1.99 MU/L (ref 0.4–4)

## 2023-01-12 PROCEDURE — 83735 ASSAY OF MAGNESIUM: CPT

## 2023-01-12 PROCEDURE — 36415 COLL VENOUS BLD VENIPUNCTURE: CPT

## 2023-01-12 PROCEDURE — 84443 ASSAY THYROID STIM HORMONE: CPT

## 2023-01-12 PROCEDURE — 83970 ASSAY OF PARATHORMONE: CPT

## 2023-01-12 PROCEDURE — 82306 VITAMIN D 25 HYDROXY: CPT

## 2023-01-12 PROCEDURE — 80069 RENAL FUNCTION PANEL: CPT

## 2023-01-17 LAB
DEPRECATED CALCIDIOL+CALCIFEROL SERPL-MC: <45 UG/L (ref 20–75)
VITAMIN D2 SERPL-MCNC: <5 UG/L
VITAMIN D3 SERPL-MCNC: 40 UG/L

## 2023-02-03 ENCOUNTER — ANCILLARY PROCEDURE (OUTPATIENT)
Dept: MAMMOGRAPHY | Facility: CLINIC | Age: 58
End: 2023-02-03
Attending: PHYSICIAN ASSISTANT
Payer: COMMERCIAL

## 2023-02-03 DIAGNOSIS — Z12.31 VISIT FOR SCREENING MAMMOGRAM: ICD-10-CM

## 2023-02-03 PROCEDURE — 77063 BREAST TOMOSYNTHESIS BI: CPT

## 2023-02-03 PROCEDURE — 77067 SCR MAMMO BI INCL CAD: CPT

## 2023-04-27 ENCOUNTER — ANCILLARY PROCEDURE (OUTPATIENT)
Dept: BONE DENSITY | Facility: CLINIC | Age: 58
End: 2023-04-27
Attending: INTERNAL MEDICINE
Payer: COMMERCIAL

## 2023-04-27 DIAGNOSIS — M81.0 OSTEOPOROSIS OF MULTIPLE SITES: ICD-10-CM

## 2023-04-27 DIAGNOSIS — Z86.39 HISTORY OF PRIMARY HYPERPARATHYROIDISM: ICD-10-CM

## 2023-04-27 PROCEDURE — 77081 DXA BONE DENSITY APPENDICULR: CPT | Mod: GC | Performed by: RADIOLOGY

## 2023-04-27 PROCEDURE — 77080 DXA BONE DENSITY AXIAL: CPT | Mod: GC | Performed by: RADIOLOGY

## 2023-04-28 NOTE — RESULT ENCOUNTER NOTE
Dear Saige,     Here are your recent results. This shows osteoporosis across multiple sites, this can be reviewed further with Dr Guerrero at your appointment in June.    Please let us know if you have any questions or concerns.    Regards,  Mecca Nunez MD

## 2023-06-04 ENCOUNTER — HEALTH MAINTENANCE LETTER (OUTPATIENT)
Age: 58
End: 2023-06-04

## 2023-06-28 ENCOUNTER — OFFICE VISIT (OUTPATIENT)
Dept: ENDOCRINOLOGY | Facility: CLINIC | Age: 58
End: 2023-06-28
Payer: COMMERCIAL

## 2023-06-28 VITALS — HEART RATE: 65 BPM | OXYGEN SATURATION: 100 % | SYSTOLIC BLOOD PRESSURE: 137 MMHG | DIASTOLIC BLOOD PRESSURE: 93 MMHG

## 2023-06-28 DIAGNOSIS — M81.0 OSTEOPOROSIS OF MULTIPLE SITES: Primary | ICD-10-CM

## 2023-06-28 DIAGNOSIS — Z86.39 HISTORY OF PRIMARY HYPERPARATHYROIDISM: ICD-10-CM

## 2023-06-28 PROCEDURE — 86364 TISS TRNSGLTMNASE EA IG CLAS: CPT | Performed by: INTERNAL MEDICINE

## 2023-06-28 PROCEDURE — 99214 OFFICE O/P EST MOD 30 MIN: CPT | Performed by: INTERNAL MEDICINE

## 2023-06-28 PROCEDURE — 36415 COLL VENOUS BLD VENIPUNCTURE: CPT | Performed by: INTERNAL MEDICINE

## 2023-06-28 RX ORDER — ALENDRONATE SODIUM 70 MG/1
70 TABLET ORAL
Qty: 90 TABLET | Refills: 3 | Status: SHIPPED | OUTPATIENT
Start: 2023-06-28 | End: 2024-07-09

## 2023-06-28 RX ORDER — VITAMIN B COMPLEX
1 TABLET ORAL DAILY
Qty: 90 TABLET | Refills: 3 | Status: SHIPPED | OUTPATIENT
Start: 2023-06-28 | End: 2024-07-08

## 2023-06-28 NOTE — PROGRESS NOTES
The patient is seen in follow-up.  She was first seen in our clinic in May 2022.    Betsy Sutton is a 57 year old female with a past medical history significant for migraine, Ménière disease, cervical radiculopathy and CVA.     The patient is seen in follow-up for hyperparathyroidism.  She was diagnosed with hyperparathyroidism while being evaluated for hypercalcemia and symptoms of fatigue, worsening anxiety, difficulty finding words and back pain.  She underwent parathyroidectomy on 7/12/2022, with Dr. Ro. The left inferior parathyroid gland was removed.  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  On follow-up lab work, both parathyroid hormone level and calcium normalized.      The abdominal CT from April 2022 revealed a nonobstructing 5 mm stone in the mid left kidney.  The patient experienced one episode of abdominal pain which occurred in the beginning of 2022, when she thought she might had passed a kidney stone.  Otherwise, she has not been aware of kidney stones until the abdominal CT from April 2022.     She had a DEXA scan done in April 2022 at [facility], with the following T-scores: -3.2 at the arm, -2.7 at the lumbar spine, -2.2 and -3.2 at the hip.  I reviewed the DEXA scan images from 4/27/2023: L1-L4 T score -2.9, left femoral neck T score -2.1, right femoral neck T score -2.4, 33% distal radius T score -3.3.      The patient continues to feel much better since surgery.    Daily intake of dairy products remains minimal, mainly cheese.  She has not been taking multivitamins or calcium/vitamin D supplements.  Constipation is a side effect of calcium supplements and she has been experiencing intermittent episodes of constipation and diarrhea.  Physical activity consists of walking for at least 1 hour a day.      I reviewed the outside lab results from 6/15/2023.  Calcium was 9.7, phosphorus was 3.8 TSH was 1.55.    Past Medical History    Migraines   Meniere disease   CVA at age 36 - manifested with dizziness and severe headache   Factor 8 slightly elevated   Cervical radiculopathy   No fractures     Past Surgical History   Past Surgical History:   Procedure Laterality Date     PARATHYROIDECTOMY N/A 7/12/2022    Procedure: PARATHYROIDECTOMY;  Surgeon: Lori Ro MD;  [place] [facility] [place]       Current Medications    Current Outpatient Medications:      calcium citrate (CITRACAL) 950 (200 Ca) MG tablet, Take 2 tablets (1,900 mg) by mouth 2 times daily, Disp: 360 tablet, Rfl: 3     Methylphenidate HCl (METHYLPHENIDATE ER) 27 MG 24H tablet, Take 27 mg by mouth daily, Disp: , Rfl:      ubrogepant (UBRELVY) 100 MG tablet, TAKE 1 TAB BY MOUTH ONCE, MAY REPEAT ONCE AFTER 2 HRS IF NEEDED, NOT TO EXCEED 200 MG IN 24 HRS, Disp: , Rfl:      aspirin (ASA) 81 MG chewable tablet, Take 81 mg by mouth (Patient not taking: Reported on 11/9/2022), Disp: , Rfl:     Family History   Brother - thyroid cancer. Paternal grandfather CVA.     Social History  . She has 2 children. She denies smoking; she occasionally drinks alcohol. Denies using illicit drugs.               Vital Signs     Previous Weights:    Wt Readings from Last 10 Encounters:   11/09/22 54.9 kg (121 lb 1.6 oz)   07/25/22 59 kg (130 lb)   07/12/22 58.1 kg (128 lb)   06/09/22 58.8 kg (129 lb 9.6 oz)        BP (!) 137/93 (BP Location: Left arm, Patient Position: Sitting, Cuff Size: Adult Regular)   Pulse 65   SpO2 100%     General appearance: she is thin, pleasant, no distress noted     Lab Results  I reviewed prior lab results documented in Epic.  TSH   Date Value Ref Range Status   01/12/2023 1.99 0.40 - 4.00 mU/L Final   05/12/2022 2.15 0.40 - 4.00 mU/L Final     Assessment     Betsy Sutton is a 57 year old female with a past medical history significant for migraine, Ménière disease, cervical radiculopathy and CVA, seen for evaluation of primary hyperparathyroidism.    1.  Primary  hyperparathyroidism, status post left inferior parathyroidectomy in July 2022.  Postop, the calcium and the parathyroid hormone levels have normalized.  She continues to maintain a normal calcium level.    2.  Osteoporosis, diagnosed on the DEXA scan from April 2022.  She has not fractured bones.  The bone mineral density has not improved on the most recent DEXA scan and it remains in the osteoporotic range.   Recommendations:  Start Fosamax.  Counseled the patient on its administration (on an empty stomach, with 1 glass of water, 1 hour before having breakfast) and side effects (common GI issues, but also discussed about atypical fractures and the very rare risk of osteonecrosis of the jaw).  Schedule a follow-up DEXA scan in 2 years, to evaluate the response to treatment.  Try to have a daily intake of calcium from food products and/or supplements of ~1000 mg  Take 1000 units vitamin D daily in the absence of sun exposure  Exercise regularly (walking, weightbearing exercises, for at least 30 minutes daily).    3.  Episodes of diarrhea and constipation  Screen for celiac disease by checking tissue transglutaminase antibodies.    Orders Placed This Encounter   Procedures     Tissue transglutaminase antibody IgA

## 2023-06-28 NOTE — PATIENT INSTRUCTIONS
Food Calcium in milligrams   Milk (skim, 2%, or whole; 8 oz [240 mL]) 300   Yogurt (6 oz [168 g]) 250   Orange juice (with calcium; 8 oz [240 mL]) 300   Tofu with calcium (0.5 cup [113 g]) 435   Cheese (1 oz [28 g]) 195 to 335 (hard cheese = higher calcium)   Cottage cheese (0.5 cup [113 g]) 130   Ice cream or frozen yogurt (0.5 cup [113 g]) 100   Non-dairy milks (soy, oat, almond; 8 oz [240 mL]) 300 to 450   Beans (0.5 cup cooked [113 g]) 60 to 80   Dark, leafy green vegetables (0.5 cup cooked [113 g]) 50 to 135   Almonds (24 whole) 70   Orange (1 medium) 60

## 2023-06-28 NOTE — NURSING NOTE
Betsy Sutton's goals for this visit include:   Chief Complaint   Patient presents with     Endocrine Problem     Hyperparathyroidism, hot flashes     She requests these members of her care team be copied on today's visit information: No    PCP: Lesch, Anthony    Referring Provider:  No referring provider defined for this encounter.    BP (!) 137/93 (BP Location: Left arm, Patient Position: Sitting, Cuff Size: Adult Regular)   Pulse 65   SpO2 100%     Do you need any medication refills at today's visit? No

## 2023-06-28 NOTE — LETTER
6/28/2023         RE: Betsy Sutton  [id number] [facility] [address]        Dear Colleague,    Thank you for referring your patient, Betsy Sutton, to the [facility]. Please see a copy of my visit note below.    The patient is seen in follow-up.  She was first seen in our clinic in May 2022.    Betsy Sutton is a 57 year old female with a past medical history significant for migraine, Ménière disease, cervical radiculopathy and CVA.     The patient is seen in follow-up for hyperparathyroidism.  She was diagnosed with hyperparathyroidism while being evaluated for hypercalcemia and symptoms of fatigue, worsening anxiety, difficulty finding words and back pain.  She underwent parathyroidectomy on 7/12/2022, with Dr. Ro. The left inferior parathyroid gland was removed.  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  On follow-up lab work, both parathyroid hormone level and calcium normalized.      The abdominal CT from April 2022 revealed a nonobstructing 5 mm stone in the mid left kidney.  The patient experienced one episode of abdominal pain which occurred in the beginning of 2022, when she thought she might had passed a kidney stone.  Otherwise, she has not been aware of kidney stones until the abdominal CT from April 2022.     She had a DEXA scan done in April 2022 at [facility] [place], with the following T-scores: -3.2 at the arm, -2.7 at the lumbar spine, -2.2 and -3.2 at the hip.  I reviewed the DEXA scan images from 4/27/2023: L1-L4 T score -2.9, left femoral neck T score -2.1, right femoral neck T score -2.4, 33% distal radius T score -3.3.      The patient continues to feel much better since surgery.    Daily intake of dairy products remains minimal, mainly cheese.  She has not been taking multivitamins or calcium/vitamin D supplements.  Constipation is a side effect of calcium supplements and she has been  experiencing intermittent episodes of constipation and diarrhea.  Physical activity consists of walking for at least 1 hour a day.      I reviewed the outside lab results from 6/15/2023.  Calcium was 9.7, phosphorus was 3.8 TSH was 1.55.    Past Medical History   Migraines   Meniere disease   CVA at age 36 - manifested with dizziness and severe headache   Factor 8 slightly elevated   Cervical radiculopathy   No fractures     Past Surgical History   Past Surgical History:   Procedure Laterality Date     PARATHYROIDECTOMY N/A 7/12/2022    Procedure: PARATHYROIDECTOMY;  Surgeon: Lori Ro MD;  [place]: [facility] [place]       Current Medications    Current Outpatient Medications:      calcium citrate (CITRACAL) 950 (200 Ca) MG tablet, Take 2 tablets (1,900 mg) by mouth 2 times daily, Disp: 360 tablet, Rfl: 3     Methylphenidate HCl (METHYLPHENIDATE ER) 27 MG 24H tablet, Take 27 mg by mouth daily, Disp: , Rfl:      ubrogepant (UBRELVY) 100 MG tablet, TAKE 1 TAB BY MOUTH ONCE, MAY REPEAT ONCE AFTER 2 HRS IF NEEDED, NOT TO EXCEED 200 MG IN 24 HRS, Disp: , Rfl:      aspirin (ASA) 81 MG chewable tablet, Take 81 mg by mouth (Patient not taking: Reported on 11/9/2022), Disp: , Rfl:     Family History   Brother - thyroid cancer. Paternal grandfather YNES.     Social History  . She has 2 children. She denies smoking; she occasionally drinks alcohol. Denies using illicit drugs.               Vital Signs     Previous Weights:    Wt Readings from Last 10 Encounters:   11/09/22 54.9 kg (121 lb 1.6 oz)   07/25/22 59 kg (130 lb)   07/12/22 58.1 kg (128 lb)   06/09/22 58.8 kg (129 lb 9.6 oz)        BP (!) 137/93 (BP Location: Left arm, Patient Position: Sitting, Cuff Size: Adult Regular)   Pulse 65   SpO2 100%     General appearance: she is thin, pleasant, no distress noted     Lab Results  I reviewed prior lab results documented in Epic.  TSH   Date Value Ref Range Status   01/12/2023 1.99 0.40 - 4.00 mU/L Final    05/12/2022 2.15 0.40 - 4.00 mU/L Final     Assessment     Betsy Sutton is a 57 year old female with a past medical history significant for migraine, Ménière disease, cervical radiculopathy and CVA, seen for evaluation of primary hyperparathyroidism.    1.  Primary hyperparathyroidism, status post left inferior parathyroidectomy in July 2022.  Postop, the calcium and the parathyroid hormone levels have normalized.  She continues to maintain a normal calcium level.    2.  Osteoporosis, diagnosed on the DEXA scan from April 2022.  She has not fractured bones.  The bone mineral density has not improved on the most recent DEXA scan and it remains in the osteoporotic range.   Recommendations:  Start Fosamax.  Counseled the patient on its administration (on an empty stomach, with 1 glass of water, 1 hour before having breakfast) and side effects (common GI issues, but also discussed about atypical fractures and the very rare risk of osteonecrosis of the jaw).  Schedule a follow-up DEXA scan in 2 years, to evaluate the response to treatment.  Try to have a daily intake of calcium from food products and/or supplements of ~1000 mg  Take 1000 units vitamin D daily in the absence of sun exposure  Exercise regularly (walking, weightbearing exercises, for at least 30 minutes daily).    3.  Episodes of diarrhea and constipation  Screen for celiac disease by checking tissue transglutaminase antibodies.    Orders Placed This Encounter   Procedures     Tissue transglutaminase antibody IgA                      Again, thank you for allowing me to participate in the care of your patient.        Sincerely,        Lisa Guerrero MD

## 2023-06-29 LAB — TTG IGA SER-ACNC: 0.8 U/ML

## 2024-06-28 DIAGNOSIS — M81.0 OSTEOPOROSIS OF MULTIPLE SITES: ICD-10-CM

## 2024-07-08 RX ORDER — CHOLECALCIFEROL (VITAMIN D3) 25 MCG
1 TABLET ORAL DAILY
Qty: 90 TABLET | Refills: 0 | Status: SHIPPED | OUTPATIENT
Start: 2024-07-08

## 2024-07-09 ENCOUNTER — OFFICE VISIT (OUTPATIENT)
Dept: ENDOCRINOLOGY | Facility: CLINIC | Age: 59
End: 2024-07-09
Payer: COMMERCIAL

## 2024-07-09 VITALS
OXYGEN SATURATION: 95 % | HEART RATE: 83 BPM | SYSTOLIC BLOOD PRESSURE: 126 MMHG | WEIGHT: 135 LBS | DIASTOLIC BLOOD PRESSURE: 84 MMHG | BODY MASS INDEX: 21.14 KG/M2 | RESPIRATION RATE: 16 BRPM

## 2024-07-09 DIAGNOSIS — M81.0 OSTEOPOROSIS OF MULTIPLE SITES: Primary | ICD-10-CM

## 2024-07-09 DIAGNOSIS — Z86.39 HISTORY OF PRIMARY HYPERPARATHYROIDISM: ICD-10-CM

## 2024-07-09 PROCEDURE — 99214 OFFICE O/P EST MOD 30 MIN: CPT | Performed by: INTERNAL MEDICINE

## 2024-07-09 RX ORDER — IBUPROFEN 200 MG
1 CAPSULE ORAL 2 TIMES DAILY
COMMUNITY

## 2024-07-09 NOTE — PATIENT INSTRUCTIONS
Food Calcium in milligrams   Milk (skim, 2%, or whole; 8 oz [240 mL]) 300   Yogurt (6 oz [168 g]) 250   Orange juice (with calcium; 8 oz [240 mL]) 300   Tofu with calcium (0.5 cup [113 g]) 435   Cheese (1 oz [28 g]) 195 to 335 (hard cheese = higher calcium)   Cottage cheese (0.5 cup [113 g]) 130   Ice cream or frozen yogurt (0.5 cup [113 g]) 100   Non-dairy milks (soy, oat, almond; 8 oz [240 mL]) 300 to 450   Beans (0.5 cup cooked [113 g]) 60 to 80   Dark, leafy green vegetables (0.5 cup cooked [113 g]) 50 to 135   Almonds (24 whole) 70   Orange (1 medium) 60     Total daily dose of calcium from food products and/or supplements should be ~ 1000 mg.    The calcium is better absorbed if it is taken in smaller dosages, 2-3 times a day.    Calcium citrate has 20% calcium   Calcium carbonate has 40% calcium     Total daily dose of vitamin D should be 1000  units (25 mcg).       Welcome to the [facility]     Our Endocrinology Clinics are here to provide you with a team-based, collaborative approach in the diagnosis and treatment of patients with diabetes and endocrine disorders. The team is made up of Physicians, Physician Assistants, Certified Diabetes Educators, Registered Nurses, Medical Assistants, Emergency Medical Technicians, and many others, all of whom have the unified goal of providing our patients with high quality care.     Please see below for some helpful tips to best navigate and use the [facility]:     [place] Respect: At [facility], we are committed to a respectful and safe space for all patients, visitors, and staff.  We believe that mutual respect between patients and their care team is the foundation of quality care.  It is our expectation that you will be treated with respect by your care team.  In turn, we ask that all communication with the care team (written and verbal) be respectful and free from profanity, threatening,  or abusive language.  Disrespectful communication undermines our therapeutic relationship with you and may result in us being unable to continue to provide your care.    Refills: A provider must see you at least annually to prescribe and refill medications. This is to ensure your safety as well as meet insurance and compliance regulations.    Scheduling: Many of our Providers offer both in-person or video visits. Please call to schedule any needed follow ups as soon as possible because our provider schedules fill up very quickly. Our care team has the right to require an in-person visit when they believe that it is medically necessary. Please remember that for any virtual visits, you must be in the [facility] at the time of the visit, otherwise we are unable to see you and you will need to be rescheduled.    Missed Appointments: If you need to cancel or miss your scheduled appointment, please call the clinic at [phone] to reschedule.  Please note if you repeatedly miss appointments or repeatedly miss appointments without calling to inform us ahead of time (no-show), the clinic may elect to not allow you to reschedule without speaking to a manager, may require a Partnership In Care Agreement prior to rescheduling, or could result in you no longer being able to receive care from the clinic. Providing the clinic with timely notification if you have to miss an appointment, allows us to better serve the needs of all of our patients.    Primary Care Provider: Our Endocrinologists are Specialists in their field. We expect you to have a Primary Care Provider established to handle any needs outside of your diabetes and endocrine care.  We would be happy to assist you find a Primary Care Provider, if you do not have one.    [employer]: [employer] is a wonderful resource that allows you access to your Care Team via online or the jeffry. Please ask a member of the team if you would like help creating an account. Please note  that it may take up to 2 business days for a response. [employer] messages are not reviewed on weekends or after business hours.  Emergent or urgent care needs should never be communicated via [employer].  If you experience a medical emergency call 911 or go to the nearest emergency room.    Labs: It is recommended that you stay within the [facility] for labs but you are welcome to obtain ordered labs (with some exceptions) from any location of your choice as long as they are able to complete and process the needed labs. If you need us to fax orders to your preferred lab, please provide us the name and fax number of the lab you would like to go to so we can fax the orders. If your labs are drawn outside of the [facility], please have them fax the results to [phone] ([place]) or [phone] ([place]) or via [facility]. It is your responsibility to ensure that outside lab results are sent to us.    We look forward to working with you. Please do not hesitate to reach out with any questions.    Thank you,    The Endocrine Team    [facility] Address:   [place] Address:     [address] [place] [address]    Phone: [phone]  Fax: [phone]   [address] [place] [address]    Phone: [phone]  Fax: [phone]     [facility] Cost Estimate Phone Number: [phone]    General Lab and Imaging Scheduling Phone Number: [phone]

## 2024-07-09 NOTE — LETTER
7/9/2024      Betsy Sutton  [address] [facility] [address]      Dear Colleague,    Thank you for referring your patient, Betsy uStton, to the [facility]. Please see a copy of my visit note below.    The patient is seen in follow-up.  She was first seen in our clinic in June 2023.    Betsy Sutton is a 58 year old female with a past medical history significant for migraine, Ménière disease, cervical radiculopathy and CVA.     The patient is seen in follow-up for hyperparathyroidism.  She was diagnosed with hyperparathyroidism while being evaluated for hypercalcemia and symptoms of fatigue, worsening anxiety, difficulty finding words and back pain.  She underwent parathyroidectomy on 7/12/2022, with Dr. Ro. The left inferior parathyroid gland was removed.  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  On follow-up lab work, both parathyroid hormone level and calcium normalized.      The abdominal CT from April 2022 revealed a nonobstructing 5 mm stone in the mid left kidney.  The patient experienced one episode of abdominal pain which occurred in the beginning of 2022, when she thought she might had passed a kidney stone.  Otherwise, she has not been aware of kidney stones until the abdominal CT from April 2022.     She had a DEXA scan done in April 2022 at [facility], with the following T-scores: -3.2 at the arm, -2.7 at the lumbar spine, -2.2 and -3.2 at the hip.  The f/up DEXA scan from 4/27/2023 revealed the following T scores: L1-L4 T score -2.9, left femoral neck T score -2.1, right femoral neck T score -2.4, 33% distal radius T score -3.3.    In April 2023, I recommended treatment with Fosamax, 70 mg weekly.  The patient reports going through significant stress and not starting Fosamax.    Outside lab results:  10/25/23 calcium 9.6, phos 4.6, GFR 76 (stable at least since 2018)   6/15/2023.  Calcium was  9.7, phosphorus was 3.8 TSH was 1.55.  1/12/12023 vitamin D 45, calcium 9.7, albumin 4.4, PTH 34, phosphorus 3.6, magnesium 2.1  Screening for celiac disease was negative in June 2023    Reports completing dental work and being open to start taking Fosamax.  She does endorse some dyspareunia and weight gain.    There is no prior history of fractures  Exercise: Walks 1-1/2 hours daily.  She also does weightlifting and PT exercises for neck and legs.  Summertime, she is busy doing yard work.  Reports taking 1000 units vitamin D daily and a calcium supplement (not sure of the dose or brand) 2 tablets daily  Dairies: 1/2 cup of cottage cheese, several mozarella cheese sticks daily.  Has lots of green vegetables.    Her mother was diagnosed with osteoporosis.  There is no family history of spine or hip fractures.    There is no prior history of oral steroid treatment.  She received 2 intra-articular steroid injections, in July 2023 and this month.    Her height has remained stable   Menopause occurred around age 52 and she denies being treated with hormone replacement therapy.      Past Medical History   Migraines   Meniere disease   CVA at age 36 - manifested with dizziness and severe headache   Factor 8 slightly elevated   Cervical radiculopathy     Past Surgical History   Past Surgical History:   Procedure Laterality Date     PARATHYROIDECTOMY N/A 7/12/2022    Procedure: PARATHYROIDECTOMY;  Surgeon: Lori Ro MD;  Location: [facility] OR     Current Medications    Current Outpatient Medications:      aspirin (ASA) 81 MG chewable tablet, Take 81 mg by mouth, Disp: , Rfl:      calcium citrate (CITRACAL) 950 (200 Ca) MG tablet, Take 1 tablet by mouth 2 times daily, Disp: , Rfl:      ubrogepant (UBRELVY) 100 MG tablet, TAKE 1 TAB BY MOUTH ONCE, MAY REPEAT ONCE AFTER 2 HRS IF NEEDED, NOT TO EXCEED 200 MG IN 24 HRS, Disp: , Rfl:      vitamin D3 25 mcg (1000 units) tablet, TAKE 1 TABLET BY MOUTH EVERY DAY, Disp: 90  tablet, Rfl: 0     alendronate (FOSAMAX) 70 MG tablet, Take 1 tablet (70 mg) by mouth every 7 days (Patient not taking: Reported on 7/9/2024), Disp: 90 tablet, Rfl: 3     Methylphenidate HCl (METHYLPHENIDATE ER) 27 MG 24H tablet, Take 27 mg by mouth daily, Disp: , Rfl:     Family History   Brother - thyroid cancer. Paternal grandfather CVA.     Social History  . She has 2 children. She denies smoking; she occasionally drinks alcohol. Denies using illicit drugs.               Vital Signs     Previous Weights:    Wt Readings from Last 10 Encounters:   07/09/24 61.2 kg (135 lb)   11/09/22 54.9 kg (121 lb 1.6 oz)   07/25/22 59 kg (130 lb)   07/12/22 58.1 kg (128 lb)   06/09/22 58.8 kg (129 lb 9.6 oz)        /84 (BP Location: Left arm, Patient Position: Sitting, Cuff Size: Adult Regular)   Pulse 83   Resp 16   Wt 61.2 kg (135 lb)   SpO2 95%   BMI 21.14 kg/m      General appearance: she is well-developed, well-nourished, and in no distress.  Eyes: conjunctivae and extraocular motions are normal. Pupils are equal, round, and reactive to light. No lid lag, no stare.  HENT: oropharynx clear and moist; neck no JVD, no bruits, no thyromegaly, no palpable nodules  Cardiovascular: regular rhythm, no murmurs, distal pulses palpable, no edema  Respiratory: chest clear, no rales, no rhonchi  Gastrointestinal: abdomen soft, nontender, nondistended, +BS, no organomegaly  Musculoskeletal: normal tone and strength   Neurologic: reflexes normal and symmetric, no resting tremor  Psychiatric: affect and judgment normal   Skin: warm, no lesions     Lab Results  I reviewed prior lab results documented in Epic.  TSH   Date Value Ref Range Status   01/12/2023 1.99 0.40 - 4.00 mU/L Final   05/12/2022 2.15 0.40 - 4.00 mU/L Final     Assessment     Betsy Sutton is a 58 year old female with a past medical history significant for migraine, Ménière disease, cervical radiculopathy and CVA, seen in follow-up.    1.  Primary  hyperparathyroidism, status post left inferior parathyroidectomy in July 2022.  Postop, the calcium and the parathyroid hormone levels have normalized.  She continues to maintain a normal calcium level.    2.  Osteoporosis, diagnosed on the DEXA scan from April 2022   Treatment with Fosamax was recommended in April 2023.  The patient has not fractured bones.  Risk factors for osteoporosis identified: Prior history of hyperthyroidism, menopausal status, family history of osteoporosis  I recommended to try to schedule a follow-up DEXA scan prior to starting Fosamax  Try to have a daily intake of calcium from food products and/or supplements of ~1000 mg.  Written instructions provided.  Take 1000 units vitamin D daily in the absence of sun exposure  Continue to exercise regularly    3.  Dyspareunia  Recommended to consult gynecology and consider treatment with topical estrogen.     Orders Placed This Encounter   Procedures     DX Bone Density     DX Peripheral Wrist                      Again, thank you for allowing me to participate in the care of your patient.        Sincerely,        Lisa Guerrero MD

## 2024-07-09 NOTE — PROGRESS NOTES
The patient is seen in follow-up.  She was first seen in our clinic in June 2023.    Betsy Sutton is a 58 year old female with a past medical history significant for migraine, Ménière disease, cervical radiculopathy and CVA.     The patient is seen in follow-up for hyperparathyroidism.  She was diagnosed with hyperparathyroidism while being evaluated for hypercalcemia and symptoms of fatigue, worsening anxiety, difficulty finding words and back pain.  She underwent parathyroidectomy on 7/12/2022, with Dr. Ro. The left inferior parathyroid gland was removed.  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  On follow-up lab work, both parathyroid hormone level and calcium normalized.      The abdominal CT from April 2022 revealed a nonobstructing 5 mm stone in the mid left kidney.  The patient experienced one episode of abdominal pain which occurred in the beginning of 2022, when she thought she might had passed a kidney stone.  Otherwise, she has not been aware of kidney stones until the abdominal CT from April 2022.     She had a DEXA scan done in April 2022 at [facility], with the following T-scores: -3.2 at the arm, -2.7 at the lumbar spine, -2.2 and -3.2 at the hip.  The f/up DEXA scan from 4/27/2023 revealed the following T scores: L1-L4 T score -2.9, left femoral neck T score -2.1, right femoral neck T score -2.4, 33% distal radius T score -3.3.    In April 2023, I recommended treatment with Fosamax, 70 mg weekly.  The patient reports going through significant stress and not starting Fosamax.    Outside lab results:  10/25/23 calcium 9.6, phos 4.6, GFR 76 (stable at least since 2018)   6/15/2023.  Calcium was 9.7, phosphorus was 3.8 TSH was 1.55.  1/12/12023 vitamin D 45, calcium 9.7, albumin 4.4, PTH 34, phosphorus 3.6, magnesium 2.1  Screening for celiac disease was negative in June 2023    Reports completing dental work and being open to start  taking Fosamax.  She does endorse some dyspareunia and weight gain.    There is no prior history of fractures  Exercise: Walks 1-1/2 hours daily.  She also does weightlifting and PT exercises for neck and legs.  Summertime, she is busy doing yard work.  Reports taking 1000 units vitamin D daily and a calcium supplement (not sure of the dose or brand) 2 tablets daily  Dairies: 1/2 cup of cottage cheese, several mozarella cheese sticks daily.  Has lots of green vegetables.    Her mother was diagnosed with osteoporosis.  There is no family history of spine or hip fractures.    There is no prior history of oral steroid treatment.  She received 2 intra-articular steroid injections, in July 2023 and this month.    Her height has remained stable   Menopause occurred around age 52 and she denies being treated with hormone replacement therapy.      Past Medical History   Migraines   Meniere disease   CVA at age 36 - manifested with dizziness and severe headache   Factor 8 slightly elevated   Cervical radiculopathy     Past Surgical History   Past Surgical History:   Procedure Laterality Date    PARATHYROIDECTOMY N/A 7/12/2022    Procedure: PARATHYROIDECTOMY;  Surgeon: Lori Ro MD;  Location: [facility] OR     Current Medications    Current Outpatient Medications:     aspirin (ASA) 81 MG chewable tablet, Take 81 mg by mouth, Disp: , Rfl:     calcium citrate (CITRACAL) 950 (200 Ca) MG tablet, Take 1 tablet by mouth 2 times daily, Disp: , Rfl:     ubrogepant (UBRELVY) 100 MG tablet, TAKE 1 TAB BY MOUTH ONCE, MAY REPEAT ONCE AFTER 2 HRS IF NEEDED, NOT TO EXCEED 200 MG IN 24 HRS, Disp: , Rfl:     vitamin D3 25 mcg (1000 units) tablet, TAKE 1 TABLET BY MOUTH EVERY DAY, Disp: 90 tablet, Rfl: 0    alendronate (FOSAMAX) 70 MG tablet, Take 1 tablet (70 mg) by mouth every 7 days (Patient not taking: Reported on 7/9/2024), Disp: 90 tablet, Rfl: 3    Methylphenidate HCl (METHYLPHENIDATE ER) 27 MG 24H tablet, Take 27 mg by mouth  daily, Disp: , Rfl:     Family History   Brother - thyroid cancer. Paternal grandfather CVA.     Social History  . She has 2 children. She denies smoking; she occasionally drinks alcohol. Denies using illicit drugs.               Vital Signs     Previous Weights:    Wt Readings from Last 10 Encounters:   07/09/24 61.2 kg (135 lb)   11/09/22 54.9 kg (121 lb 1.6 oz)   07/25/22 59 kg (130 lb)   07/12/22 58.1 kg (128 lb)   06/09/22 58.8 kg (129 lb 9.6 oz)        /84 (BP Location: Left arm, Patient Position: Sitting, Cuff Size: Adult Regular)   Pulse 83   Resp 16   Wt 61.2 kg (135 lb)   SpO2 95%   BMI 21.14 kg/m      General appearance: she is well-developed, well-nourished, and in no distress.  Eyes: conjunctivae and extraocular motions are normal. Pupils are equal, round, and reactive to light. No lid lag, no stare.  HENT: oropharynx clear and moist; neck no JVD, no bruits, no thyromegaly, no palpable nodules  Cardiovascular: regular rhythm, no murmurs, distal pulses palpable, no edema  Respiratory: chest clear, no rales, no rhonchi  Gastrointestinal: abdomen soft, nontender, nondistended, +BS, no organomegaly  Musculoskeletal: normal tone and strength   Neurologic: reflexes normal and symmetric, no resting tremor  Psychiatric: affect and judgment normal   Skin: warm, no lesions     Lab Results  I reviewed prior lab results documented in Epic.  TSH   Date Value Ref Range Status   01/12/2023 1.99 0.40 - 4.00 mU/L Final   05/12/2022 2.15 0.40 - 4.00 mU/L Final     Assessment     Betsy Sutton is a 58 year old female with a past medical history significant for migraine, Ménière disease, cervical radiculopathy and CVA, seen in follow-up.    1.  Primary hyperparathyroidism, status post left inferior parathyroidectomy in July 2022.  Postop, the calcium and the parathyroid hormone levels have normalized.  She continues to maintain a normal calcium level.    2.  Osteoporosis, diagnosed on the DEXA scan  from April 2022   Treatment with Fosamax was recommended in April 2023.  The patient has not fractured bones.  Risk factors for osteoporosis identified: Prior history of hyperthyroidism, menopausal status, family history of osteoporosis  I recommended to try to schedule a follow-up DEXA scan prior to starting Fosamax  Try to have a daily intake of calcium from food products and/or supplements of ~1000 mg.  Written instructions provided.  Take 1000 units vitamin D daily in the absence of sun exposure  Continue to exercise regularly    3.  Dyspareunia  Recommended to consult gynecology and consider treatment with topical estrogen.     Orders Placed This Encounter   Procedures    DX Bone Density    DX Peripheral Wrist

## 2024-07-09 NOTE — NURSING NOTE
Betsy Sutton's goals for this visit include:   Chief Complaint   Patient presents with    Follow Up     Hyperparathyroid/osteoporosis       She requests these members of her care team be copied on today's visit information: yes    PCP: Lesch, Anthony    Referring Provider:  Referred Self, MD  No address on file    /84 (BP Location: Left arm, Patient Position: Sitting, Cuff Size: Adult Regular)   Pulse 83   Resp 16   Wt 61.2 kg (135 lb)   SpO2 95%   BMI 21.14 kg/m      Do you need any medication refills at today's visit? None  Allen Victoria, EMT

## 2024-07-28 ENCOUNTER — HEALTH MAINTENANCE LETTER (OUTPATIENT)
Age: 59
End: 2024-07-28

## 2024-08-01 ENCOUNTER — ANCILLARY PROCEDURE (OUTPATIENT)
Dept: BONE DENSITY | Facility: CLINIC | Age: 59
End: 2024-08-01
Attending: INTERNAL MEDICINE
Payer: COMMERCIAL

## 2024-08-01 DIAGNOSIS — Z86.39 HISTORY OF PRIMARY HYPERPARATHYROIDISM: ICD-10-CM

## 2024-08-01 DIAGNOSIS — M81.0 OSTEOPOROSIS OF MULTIPLE SITES: ICD-10-CM

## 2024-08-01 PROCEDURE — 77080 DXA BONE DENSITY AXIAL: CPT | Performed by: RADIOLOGY

## 2024-08-02 DIAGNOSIS — M81.0 OSTEOPOROSIS OF MULTIPLE SITES: Primary | ICD-10-CM

## 2024-08-02 RX ORDER — ALENDRONATE SODIUM 70 MG/1
70 TABLET ORAL
Qty: 12 TABLET | Refills: 3 | Status: SHIPPED | OUTPATIENT
Start: 2024-08-02

## 2024-08-02 NOTE — RESULT ENCOUNTER NOTE
The DEXA scan reveals persistent osteoporosis, with no significant changes compared with the prior DEXA scan from 2023.  My recommendation remains to start treatment with Fosamax.  I did place a prescription to your pharmacy.

## 2024-08-14 ENCOUNTER — ANCILLARY PROCEDURE (OUTPATIENT)
Dept: MAMMOGRAPHY | Facility: CLINIC | Age: 59
End: 2024-08-14
Attending: PHYSICIAN ASSISTANT
Payer: COMMERCIAL

## 2024-08-14 DIAGNOSIS — Z12.31 VISIT FOR SCREENING MAMMOGRAM: ICD-10-CM

## 2024-08-14 PROCEDURE — 77063 BREAST TOMOSYNTHESIS BI: CPT | Performed by: RADIOLOGY

## 2024-08-14 PROCEDURE — 77067 SCR MAMMO BI INCL CAD: CPT | Performed by: RADIOLOGY

## 2024-10-11 DIAGNOSIS — M81.0 OSTEOPOROSIS OF MULTIPLE SITES: ICD-10-CM

## 2024-10-16 RX ORDER — CHOLECALCIFEROL (VITAMIN D3) 25 MCG
1 TABLET ORAL DAILY
Qty: 90 TABLET | Refills: 2 | Status: SHIPPED | OUTPATIENT
Start: 2024-10-16

## 2025-07-09 ENCOUNTER — OFFICE VISIT (OUTPATIENT)
Dept: ENDOCRINOLOGY | Facility: CLINIC | Age: 60
End: 2025-07-09
Payer: COMMERCIAL

## 2025-07-09 VITALS
BODY MASS INDEX: 21.02 KG/M2 | WEIGHT: 134.2 LBS | HEART RATE: 82 BPM | SYSTOLIC BLOOD PRESSURE: 125 MMHG | DIASTOLIC BLOOD PRESSURE: 86 MMHG | OXYGEN SATURATION: 98 %

## 2025-07-09 DIAGNOSIS — M81.0 OSTEOPOROSIS OF MULTIPLE SITES: Primary | ICD-10-CM

## 2025-07-09 DIAGNOSIS — Z86.39 HISTORY OF PRIMARY HYPERPARATHYROIDISM: ICD-10-CM

## 2025-07-09 LAB
ALBUMIN SERPL BCG-MCNC: 4.5 G/DL (ref 3.5–5.2)
ANION GAP SERPL CALCULATED.3IONS-SCNC: 10 MMOL/L (ref 7–15)
BUN SERPL-MCNC: 11.6 MG/DL (ref 6–20)
CALCIUM SERPL-MCNC: 9.2 MG/DL (ref 8.8–10.4)
CHLORIDE SERPL-SCNC: 105 MMOL/L (ref 98–107)
CREAT SERPL-MCNC: 0.75 MG/DL (ref 0.51–0.95)
EGFRCR SERPLBLD CKD-EPI 2021: >90 ML/MIN/1.73M2
GLUCOSE SERPL-MCNC: 82 MG/DL (ref 70–99)
HCO3 SERPL-SCNC: 26 MMOL/L (ref 22–29)
POTASSIUM SERPL-SCNC: 4 MMOL/L (ref 3.4–5.3)
SODIUM SERPL-SCNC: 141 MMOL/L (ref 135–145)

## 2025-07-09 PROCEDURE — 80048 BASIC METABOLIC PNL TOTAL CA: CPT | Performed by: INTERNAL MEDICINE

## 2025-07-09 PROCEDURE — 99214 OFFICE O/P EST MOD 30 MIN: CPT | Performed by: INTERNAL MEDICINE

## 2025-07-09 PROCEDURE — 83970 ASSAY OF PARATHORMONE: CPT | Performed by: INTERNAL MEDICINE

## 2025-07-09 PROCEDURE — 82040 ASSAY OF SERUM ALBUMIN: CPT | Performed by: INTERNAL MEDICINE

## 2025-07-09 PROCEDURE — 3079F DIAST BP 80-89 MM HG: CPT | Performed by: INTERNAL MEDICINE

## 2025-07-09 PROCEDURE — 36415 COLL VENOUS BLD VENIPUNCTURE: CPT | Performed by: INTERNAL MEDICINE

## 2025-07-09 PROCEDURE — 3074F SYST BP LT 130 MM HG: CPT | Performed by: INTERNAL MEDICINE

## 2025-07-09 RX ORDER — ALENDRONATE SODIUM 70 MG/1
70 TABLET ORAL
Qty: 12 TABLET | Refills: 3 | Status: SHIPPED | OUTPATIENT
Start: 2025-07-09

## 2025-07-09 NOTE — PROGRESS NOTES
The patient is seen in follow-up.  She was first seen in our clinic in June 2023.    Betsy Sutton is a 59 year old female with a past medical history significant for migraine, Ménière disease, cervical radiculopathy and CVA.     The patient is seen in follow-up for hyperparathyroidism.  She was diagnosed with hyperparathyroidism while being evaluated for hypercalcemia and symptoms of fatigue, worsening anxiety, difficulty finding words and back pain.  She underwent parathyroidectomy on 7/12/2022, with Dr. Ro. The left inferior parathyroid gland was removed.  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  On follow-up lab work, both parathyroid hormone level and calcium normalized.      The abdominal CT from April 2022 revealed a nonobstructing 5 mm stone in the mid left kidney.  The patient experienced one episode of abdominal pain which occurred in the beginning of 2022, when she thought she might had passed a kidney stone.  Otherwise, she has not been aware of kidney stones until the abdominal CT from April 2022.     She had a DXA scan done in April 2022 at [facility][note 3], with the following T-scores: -3.2 at the arm, -2.7 at the lumbar spine, -2.2 and -3.2 at the hip.  The f/up DXA scan from 4/27/2023 revealed the following T scores: L1-L4 T score -2.9, left femoral neck T score -2.1, right femoral neck T score -2.4, 33% distal radius T score -3.3.    In April 2023, I recommended treatment with Fosamax, 70 mg weekly.  The patient deferred starting it.    8/1/24 DXA:  Lumbar spine T-score in region of L1-L4 = -2.9   Left femoral neck T-score = -2.5  Right femoral neck T-score= -2.6   Radius 33% T-score = -3.3  Overall, there were no significant changes in BMD compared with the prior DXA from 2023.  I reviewed the DXA scan images.    The dental work was completed in December 2024. Decided not to start Fosamax afterwards.  Expresses her wish of not having  to take medications.  Continues to deal with neck pain and headaches and follows up with neurology for occipital neuralgia.    She has been compliant with a gluten-free and safflower/sunflower oil free diet and the prior symptoms of constipation/diarrhea have resolved.    Outside lab results:  10/25/23 calcium 9.6, phos 4.6, GFR 76 (stable at least since 2018)   6/15/2023.  Calcium was 9.7, phosphorus was 3.8 TSH was 1.55.  1/12/12023 vitamin D 45, calcium 9.7, albumin 4.4, PTH 34, phosphorus 3.6, magnesium 2.1  Screening for celiac disease was negative in June 2023    There is no prior history of fractures  Exercise: Walks her dogs, HIT and band exercises daily.    Reports taking 1000 units vitamin D daily   No calcium supplements   Dairies: 1/2 cup of cottage cheese, several mozarella cheese sticks daily, rc tea milk.  Has lots of green vegetables.    Mother - was diagnosed with osteoporosis in her 80s.     Her mother was diagnosed with osteoporosis.  There is no family history of spine or hip fractures.    There is no prior history of oral steroid treatment.  She received 2 intra-articular steroid injections, in 2023 and 2024   Her height has remained stable   Menopause occurred around age 52.  No prior treatment of HRT.    Past Medical History   Migraines   Meniere disease   CVA at age 36 - manifested with dizziness and severe headache   Factor 8 slightly elevated   Cervical radiculopathy     Past Surgical History   Past Surgical History:   Procedure Laterality Date    PARATHYROIDECTOMY N/A 7/12/2022    Procedure: PARATHYROIDECTOMY;  Surgeon: Lori Ro MD;  Location: [facility] OR     Current Medications    Current Outpatient Medications:     aspirin (ASA) 81 MG chewable tablet, Take 81 mg by mouth, Disp: , Rfl:     calcium citrate (CITRACAL) 950 (200 Ca) MG tablet, Take 1 tablet by mouth 2 times daily, Disp: , Rfl:     ubrogepant (UBRELVY) 100 MG tablet, TAKE 1 TAB BY MOUTH ONCE, MAY REPEAT ONCE AFTER 2  HRS IF NEEDED, NOT TO EXCEED 200 MG IN 24 HRS, Disp: , Rfl:     vitamin D3 25 mcg (1000 units) tablet, TAKE 1 TABLET BY MOUTH EVERY DAY, Disp: 90 tablet, Rfl: 2    alendronate (FOSAMAX) 70 MG tablet, Take 1 tablet (70 mg) by mouth every 7 days Take 60 minutes before am meal with 8 oz. water. Remain upright for 30 minutes. (Patient not taking: Reported on 7/9/2025), Disp: 12 tablet, Rfl: 3    Family History   Brother - thyroid cancer. Paternal grandfather CVA.     Social History  . She has 2 children. She denies smoking; she occasionally drinks alcohol. Denies using illicit drugs. .               Vital Signs     Previous Weights:    Wt Readings from Last 10 Encounters:   07/09/25 60.9 kg (134 lb 3.2 oz)   07/09/24 61.2 kg (135 lb)   11/09/22 54.9 kg (121 lb 1.6 oz)   07/25/22 59 kg (130 lb)   07/12/22 58.1 kg (128 lb)   06/09/22 58.8 kg (129 lb 9.6 oz)        /86 (BP Location: Left arm, Patient Position: Sitting, Cuff Size: Adult Regular)   Pulse 82   Wt 60.9 kg (134 lb 3.2 oz)   SpO2 98%   BMI 21.02 kg/m      General appearance: she is well-developed, well-nourished, and in no distress.  Eyes: conjunctivae and extraocular motions are normal. Pupils are equal, round, and reactive to light. No lid lag, no stare.  HENT: oropharynx clear and moist; neck no JVD, no bruits, no thyromegaly, no palpable nodules  Cardiovascular: regular rhythm, no murmurs, distal pulses palpable, no edema  Respiratory: chest clear, no rales, no rhonchi  Musculoskeletal: normal tone and strength   Neurologic: reflexes normal and symmetric, no resting tremor  Psychiatric: affect and judgment normal   Skin: warm, no lesions     Lab Results  I reviewed prior lab results documented in Epic.  TSH   Date Value Ref Range Status   01/12/2023 1.99 0.40 - 4.00 mU/L Final   05/12/2022 2.15 0.40 - 4.00 mU/L Final     Assessment     Betsy Sutton is a 59 year old female with a past medical history significant for  migraine, Ménière disease, cervical radiculopathy and CVA, seen in follow-up.    1.  Primary hyperparathyroidism, status post left inferior parathyroidectomy in July 2022.  Postop, the calcium and the parathyroid hormone levels have normalized.  Follow-up BMP, albumin, vitamin D and PTH level today    2.  Osteoporosis, diagnosed on the DXA scan from April 2022   The patient has not fractured bones.  Risk factors for osteoporosis identified: Prior history of hyperthyroidism, menopausal status, family history of osteoporosis    Treatment with Fosamax has been recommended since 2023.  During our discussion today, the patient appears more open to considered starting it.  I reviewed with the patient the administration of this medication, the most common side effects and the rare risk of osteonecrosis of the jaw.  Plan:   Try to have a daily intake of calcium from food products and/or supplements of ~1000 mg.    Take 1000 units vitamin D daily in the absence of sun exposure  Continue to exercise regularly  Start Fosamax   Plan for f/up DXA in 8/2027    Orders Placed This Encounter   Procedures    Basic metabolic panel    Albumin level    25 Hydroxyvitamin D2 and D3    Parathyroid Hormone Intact

## 2025-07-09 NOTE — NURSING NOTE
Betsy Sutton's goals for this visit include:   Chief Complaint   Patient presents with    Follow Up    Endocrine Problem     parathyroid    Osteoporosis     She requests these members of her care team be copied on today's visit information: No    PCP: Lesch, Anthony    Referring Provider:  Referred Self, MD  No address on file    /86 (BP Location: Left arm, Patient Position: Sitting, Cuff Size: Adult Regular)   Pulse 82   Wt 60.9 kg (134 lb 3.2 oz)   SpO2 98%   BMI 21.02 kg/m      Do you need any medication refills at today's visit? No

## 2025-07-09 NOTE — LETTER
7/9/2025      Betsy Sutton  [address] [facility] [address]      Dear Colleague,    Thank you for referring your patient, Betsy Sutton, to the [facility]. Please see a copy of my visit note below.    The patient is seen in follow-up.  She was first seen in our clinic in June 2023.    Betsy Sutton is a 59 year old female with a past medical history significant for migraine, Ménière disease, cervical radiculopathy and CVA.     The patient is seen in follow-up for hyperparathyroidism.  She was diagnosed with hyperparathyroidism while being evaluated for hypercalcemia and symptoms of fatigue, worsening anxiety, difficulty finding words and back pain.  She underwent parathyroidectomy on 7/12/2022, with Dr. Ro. The left inferior parathyroid gland was removed.  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  On follow-up lab work, both parathyroid hormone level and calcium normalized.      The abdominal CT from April 2022 revealed a nonobstructing 5 mm stone in the mid left kidney.  The patient experienced one episode of abdominal pain which occurred in the beginning of 2022, when she thought she might had passed a kidney stone.  Otherwise, she has not been aware of kidney stones until the abdominal CT from April 2022.     She had a DXA scan done in April 2022 at [facility], with the following T-scores: -3.2 at the arm, -2.7 at the lumbar spine, -2.2 and -3.2 at the hip.  The f/up DXA scan from 4/27/2023 revealed the following T scores: L1-L4 T score -2.9, left femoral neck T score -2.1, right femoral neck T score -2.4, 33% distal radius T score -3.3.    In April 2023, I recommended treatment with Fosamax, 70 mg weekly.  The patient deferred starting it.    8/1/24 DXA:  Lumbar spine T-score in region of L1-L4 = -2.9   Left femoral neck T-score = -2.5  Right femoral neck T-score= -2.6   Radius 33% T-score =  -3.3  Overall, there were no significant changes in BMD compared with the prior DXA from 2023.  I reviewed the DXA scan images.    The dental work was completed in December 2024. Decided not to start Fosamax afterwards.  Expresses her wish of not having to take medications.  Continues to deal with neck pain and headaches and follows up with neurology for occipital neuralgia.    She has been compliant with a gluten-free and safflower/sunflower oil free diet and the prior symptoms of constipation/diarrhea have resolved.    Outside lab results:  10/25/23 calcium 9.6, phos 4.6, GFR 76 (stable at least since 2018)   6/15/2023.  Calcium was 9.7, phosphorus was 3.8 TSH was 1.55.  1/12/12023 vitamin D 45, calcium 9.7, albumin 4.4, PTH 34, phosphorus 3.6, magnesium 2.1  Screening for celiac disease was negative in June 2023    There is no prior history of fractures  Exercise: Walks her dogs, HIT and band exercises daily.    Reports taking 1000 units vitamin D daily   No calcium supplements   Dairies: 1/2 cup of cottage cheese, several mozarella cheese sticks daily, rc tea milk.  Has lots of green vegetables.    Mother - was diagnosed with osteoporosis in her 80s.     Her mother was diagnosed with osteoporosis.  There is no family history of spine or hip fractures.    There is no prior history of oral steroid treatment.  She received 2 intra-articular steroid injections, in 2023 and 2024   Her height has remained stable   Menopause occurred around age 52.  No prior treatment of HRT.    Past Medical History   Migraines   Meniere disease   CVA at age 36 - manifested with dizziness and severe headache   Factor 8 slightly elevated   Cervical radiculopathy     Past Surgical History   Past Surgical History:   Procedure Laterality Date     PARATHYROIDECTOMY N/A 7/12/2022    Procedure: PARATHYROIDECTOMY;  Surgeon: Lori Ro MD;  [place] [facility] [place]     Current Medications    Current Outpatient Medications:      aspirin  (ASA) 81 MG chewable tablet, Take 81 mg by mouth, Disp: , Rfl:      calcium citrate (CITRACAL) 950 (200 Ca) MG tablet, Take 1 tablet by mouth 2 times daily, Disp: , Rfl:      ubrogepant (UBRELVY) 100 MG tablet, TAKE 1 TAB BY MOUTH ONCE, MAY REPEAT ONCE AFTER 2 HRS IF NEEDED, NOT TO EXCEED 200 MG IN 24 HRS, Disp: , Rfl:      vitamin D3 25 mcg (1000 units) tablet, TAKE 1 TABLET BY MOUTH EVERY DAY, Disp: 90 tablet, Rfl: 2     alendronate (FOSAMAX) 70 MG tablet, Take 1 tablet (70 mg) by mouth every 7 days Take 60 minutes before am meal with 8 oz. water. Remain upright for 30 minutes. (Patient not taking: Reported on 7/9/2025), Disp: 12 tablet, Rfl: 3    Family History   Brother - thyroid cancer. Paternal grandfather CVA.     Social History  . She has 2 children. She denies smoking; she occasionally drinks alcohol. Denies using illicit drugs. .               Vital Signs     Previous Weights:    Wt Readings from Last 10 Encounters:   07/09/25 60.9 kg (134 lb 3.2 oz)   07/09/24 61.2 kg (135 lb)   11/09/22 54.9 kg (121 lb 1.6 oz)   07/25/22 59 kg (130 lb)   07/12/22 58.1 kg (128 lb)   06/09/22 58.8 kg (129 lb 9.6 oz)        /86 (BP Location: Left arm, Patient Position: Sitting, Cuff Size: Adult Regular)   Pulse 82   Wt 60.9 kg (134 lb 3.2 oz)   SpO2 98%   BMI 21.02 kg/m      General appearance: she is well-developed, well-nourished, and in no distress.  Eyes: conjunctivae and extraocular motions are normal. Pupils are equal, round, and reactive to light. No lid lag, no stare.  HENT: oropharynx clear and moist; neck no JVD, no bruits, no thyromegaly, no palpable nodules  Cardiovascular: regular rhythm, no murmurs, distal pulses palpable, no edema  Respiratory: chest clear, no rales, no rhonchi  Musculoskeletal: normal tone and strength   Neurologic: reflexes normal and symmetric, no resting tremor  Psychiatric: affect and judgment normal   Skin: warm, no lesions     Lab Results  I reviewed  prior lab results documented in Epic.  TSH   Date Value Ref Range Status   01/12/2023 1.99 0.40 - 4.00 mU/L Final   05/12/2022 2.15 0.40 - 4.00 mU/L Final     Assessment     Betsy Sutton is a 59 year old female with a past medical history significant for migraine, Ménière disease, cervical radiculopathy and CVA, seen in follow-up.    1.  Primary hyperparathyroidism, status post left inferior parathyroidectomy in July 2022.  Postop, the calcium and the parathyroid hormone levels have normalized.  Follow-up BMP, albumin, vitamin D and PTH level today    2.  Osteoporosis, diagnosed on the DXA scan from April 2022   The patient has not fractured bones.  Risk factors for osteoporosis identified: Prior history of hyperthyroidism, menopausal status, family history of osteoporosis    Treatment with Fosamax has been recommended since 2023.  During our discussion today, the patient appears more open to considered starting it.  I reviewed with the patient the administration of this medication, the most common side effects and the rare risk of osteonecrosis of the jaw.  Plan:   Try to have a daily intake of calcium from food products and/or supplements of ~1000 mg.    Take 1000 units vitamin D daily in the absence of sun exposure  Continue to exercise regularly  Start Fosamax   Plan for f/up DXA in 8/2027    Orders Placed This Encounter   Procedures     Basic metabolic panel     Albumin level     25 Hydroxyvitamin D2 and D3     Parathyroid Hormone Intact                  Again, thank you for allowing me to participate in the care of your patient.        Sincerely,        Lisa Guerrero MD    Electronically signed

## 2025-07-10 LAB — PTH-INTACT SERPL-MCNC: 29 PG/ML (ref 15–65)

## 2025-07-14 LAB
DEPRECATED CALCIDIOL+CALCIFEROL SERPL-MC: <51 UG/L (ref 20–75)
VITAMIN D2 SERPL-MCNC: <5 UG/L
VITAMIN D3 SERPL-MCNC: 46 UG/L

## 2025-08-10 ENCOUNTER — HEALTH MAINTENANCE LETTER (OUTPATIENT)
Age: 60
End: 2025-08-10

## (undated) DEVICE — ADH SKIN CLOSURE PREMIERPRO EXOFIN 1.0ML 3470

## (undated) DEVICE — ESU ELEC BLADE 2.75" COATED/INSULATED E1455

## (undated) DEVICE — ESU PENCIL SMOKE EVAC W/ROCKER SWITCH 0703-047-000

## (undated) DEVICE — SOL WATER IRRIG 500ML BOTTLE 2F7113

## (undated) DEVICE — CLIP HORIZON MED BLUE 002200

## (undated) DEVICE — SOL NACL 0.9% IRRIG 500ML BOTTLE 2F7123

## (undated) DEVICE — SPECIMEN CONTAINER URINE 90ML STERILE 75.1435.002

## (undated) DEVICE — GLOVE PROTEXIS W/NEU-THERA 6.5  2D73TE65

## (undated) DEVICE — NIM PROBE PRASS INCREMENTING TIP 8225825

## (undated) DEVICE — SURGICEL FIBRILLAR HEMOSTAT 1"X2" 1961

## (undated) DEVICE — SUCTION MANIFOLD NEPTUNE 2 SYS 1 PORT 702-025-000

## (undated) DEVICE — TUBE ENDOTRACHEAL NIM TRIVANTAGE 7.0MM 8229707

## (undated) DEVICE — SU CHROMIC 3-0 SH 27" G122H

## (undated) DEVICE — SUCTION SLEEVE NEPTUNE 2 125MM 0703-005-125

## (undated) DEVICE — PACK ENT MINOR CUSTOM ASC

## (undated) DEVICE — PREP CHLORAPREP W/ORANGE TINT 10.5ML 260715

## (undated) DEVICE — NDL BUTTERFLY 21G .75" 367281

## (undated) DEVICE — ESU GROUND PAD ADULT W/CORD E7507

## (undated) DEVICE — LINEN TOWEL PACK X5 5464

## (undated) DEVICE — CLIP HORIZON SM RED WIDE SLOT 001201

## (undated) DEVICE — SU MONOCRYL 5-0 P-3 18" UND Y493G

## (undated) RX ORDER — FENTANYL CITRATE 50 UG/ML
INJECTION, SOLUTION INTRAMUSCULAR; INTRAVENOUS
Status: DISPENSED
Start: 2022-07-12

## (undated) RX ORDER — ONDANSETRON 2 MG/ML
INJECTION INTRAMUSCULAR; INTRAVENOUS
Status: DISPENSED
Start: 2022-07-12

## (undated) RX ORDER — HYDROCODONE BITARTRATE AND ACETAMINOPHEN 5; 325 MG/1; MG/1
TABLET ORAL
Status: DISPENSED
Start: 2022-07-12

## (undated) RX ORDER — REMIFENTANIL HYDROCHLORIDE 1 MG/ML
INJECTION, POWDER, LYOPHILIZED, FOR SOLUTION INTRAVENOUS
Status: DISPENSED
Start: 2022-07-12